# Patient Record
Sex: MALE | Race: WHITE | NOT HISPANIC OR LATINO
[De-identification: names, ages, dates, MRNs, and addresses within clinical notes are randomized per-mention and may not be internally consistent; named-entity substitution may affect disease eponyms.]

---

## 2017-07-12 ENCOUNTER — APPOINTMENT (OUTPATIENT)
Dept: DERMATOLOGY | Facility: CLINIC | Age: 72
End: 2017-07-12

## 2022-07-26 ENCOUNTER — INPATIENT (INPATIENT)
Facility: HOSPITAL | Age: 77
LOS: 9 days | Discharge: NURSING, REHAB OR LONG-TERM CARE | DRG: 177 | End: 2022-08-05
Attending: INTERNAL MEDICINE | Admitting: STUDENT IN AN ORGANIZED HEALTH CARE EDUCATION/TRAINING PROGRAM
Payer: MEDICARE

## 2022-07-26 VITALS
DIASTOLIC BLOOD PRESSURE: 79 MMHG | OXYGEN SATURATION: 98 % | RESPIRATION RATE: 18 BRPM | SYSTOLIC BLOOD PRESSURE: 152 MMHG | WEIGHT: 160.06 LBS | HEART RATE: 82 BPM | TEMPERATURE: 99 F

## 2022-07-26 LAB
ALBUMIN SERPL ELPH-MCNC: 4.4 G/DL — SIGNIFICANT CHANGE UP (ref 3.3–5.2)
ALP SERPL-CCNC: 55 U/L — SIGNIFICANT CHANGE UP (ref 40–120)
ALT FLD-CCNC: 16 U/L — SIGNIFICANT CHANGE UP
ANION GAP SERPL CALC-SCNC: 12 MMOL/L — SIGNIFICANT CHANGE UP (ref 5–17)
APPEARANCE UR: CLEAR — SIGNIFICANT CHANGE UP
AST SERPL-CCNC: 19 U/L — SIGNIFICANT CHANGE UP
BACTERIA # UR AUTO: ABNORMAL
BASOPHILS # BLD AUTO: 0 K/UL — SIGNIFICANT CHANGE UP (ref 0–0.2)
BASOPHILS NFR BLD AUTO: 0 % — SIGNIFICANT CHANGE UP (ref 0–2)
BILIRUB SERPL-MCNC: 0.5 MG/DL — SIGNIFICANT CHANGE UP (ref 0.4–2)
BILIRUB UR-MCNC: NEGATIVE — SIGNIFICANT CHANGE UP
BUN SERPL-MCNC: 30.7 MG/DL — HIGH (ref 8–20)
CALCIUM SERPL-MCNC: 9.6 MG/DL — SIGNIFICANT CHANGE UP (ref 8.4–10.5)
CHLORIDE SERPL-SCNC: 101 MMOL/L — SIGNIFICANT CHANGE UP (ref 98–107)
CO2 SERPL-SCNC: 24 MMOL/L — SIGNIFICANT CHANGE UP (ref 22–29)
COLOR SPEC: YELLOW — SIGNIFICANT CHANGE UP
CREAT SERPL-MCNC: 1.24 MG/DL — SIGNIFICANT CHANGE UP (ref 0.5–1.3)
DIFF PNL FLD: ABNORMAL
EGFR: 60 ML/MIN/1.73M2 — SIGNIFICANT CHANGE UP
EOSINOPHIL # BLD AUTO: 0.04 K/UL — SIGNIFICANT CHANGE UP (ref 0–0.5)
EOSINOPHIL NFR BLD AUTO: 1 % — SIGNIFICANT CHANGE UP (ref 0–6)
EPI CELLS # UR: SIGNIFICANT CHANGE UP
GLUCOSE SERPL-MCNC: 140 MG/DL — HIGH (ref 70–99)
GLUCOSE UR QL: NEGATIVE MG/DL — SIGNIFICANT CHANGE UP
HCT VFR BLD CALC: 34.1 % — LOW (ref 39–50)
HGB BLD-MCNC: 11.3 G/DL — LOW (ref 13–17)
KETONES UR-MCNC: ABNORMAL
LEUKOCYTE ESTERASE UR-ACNC: ABNORMAL
LYMPHOCYTES # BLD AUTO: 0.13 K/UL — LOW (ref 1–3.3)
LYMPHOCYTES # BLD AUTO: 3 % — LOW (ref 13–44)
MANUAL SMEAR VERIFICATION: YES — SIGNIFICANT CHANGE UP
MCHC RBC-ENTMCNC: 30.9 PG — SIGNIFICANT CHANGE UP (ref 27–34)
MCHC RBC-ENTMCNC: 33.1 GM/DL — SIGNIFICANT CHANGE UP (ref 32–36)
MCV RBC AUTO: 93.2 FL — SIGNIFICANT CHANGE UP (ref 80–100)
MONOCYTES # BLD AUTO: 0.18 K/UL — SIGNIFICANT CHANGE UP (ref 0–0.9)
MONOCYTES NFR BLD AUTO: 4 % — SIGNIFICANT CHANGE UP (ref 2–14)
NEUTROPHILS # BLD AUTO: 4.09 K/UL — SIGNIFICANT CHANGE UP (ref 1.8–7.4)
NEUTROPHILS NFR BLD AUTO: 92 % — HIGH (ref 43–77)
NITRITE UR-MCNC: NEGATIVE — SIGNIFICANT CHANGE UP
NRBC # BLD: 0 /100 — SIGNIFICANT CHANGE UP (ref 0–0)
PH UR: 6.5 — SIGNIFICANT CHANGE UP (ref 5–8)
PLAT MORPH BLD: NORMAL — SIGNIFICANT CHANGE UP
PLATELET # BLD AUTO: 142 K/UL — LOW (ref 150–400)
POTASSIUM SERPL-MCNC: 4.1 MMOL/L — SIGNIFICANT CHANGE UP (ref 3.5–5.3)
POTASSIUM SERPL-SCNC: 4.1 MMOL/L — SIGNIFICANT CHANGE UP (ref 3.5–5.3)
PROT SERPL-MCNC: 6.8 G/DL — SIGNIFICANT CHANGE UP (ref 6.6–8.7)
PROT UR-MCNC: 30 MG/DL
RBC # BLD: 3.66 M/UL — LOW (ref 4.2–5.8)
RBC # FLD: 13.3 % — SIGNIFICANT CHANGE UP (ref 10.3–14.5)
RBC BLD AUTO: NORMAL — SIGNIFICANT CHANGE UP
RBC CASTS # UR COMP ASSIST: ABNORMAL /HPF (ref 0–4)
SODIUM SERPL-SCNC: 137 MMOL/L — SIGNIFICANT CHANGE UP (ref 135–145)
SP GR SPEC: 1.01 — SIGNIFICANT CHANGE UP (ref 1.01–1.02)
UROBILINOGEN FLD QL: 1 MG/DL
WBC # BLD: 4.45 K/UL — SIGNIFICANT CHANGE UP (ref 3.8–10.5)
WBC # FLD AUTO: 4.45 K/UL — SIGNIFICANT CHANGE UP (ref 3.8–10.5)
WBC UR QL: SIGNIFICANT CHANGE UP /HPF (ref 0–5)

## 2022-07-26 NOTE — ED ADULT NURSE NOTE - OBJECTIVE STATEMENT
Pt in no apparent distress at this time. Airway patent, breathing spontaneous and nonlabored. Pt A&Ox3 resting in stretcher. Pt c/o       , trip and fall at home, denies LOC or hitting head. pin ot right shoulder area which is chronic for pt form previous fall. last fall a few months ago.

## 2022-07-26 NOTE — ED ADULT NURSE NOTE - NSIMPLEMENTINTERV_GEN_ALL_ED
Implemented All Fall Risk Interventions:  [place] to call system. Call bell, personal items and telephone within reach. Instruct patient to call for assistance. Room bathroom lighting operational. Non-slip footwear when patient is off stretcher. Physically safe environment: no spills, clutter or unnecessary equipment. Stretcher in lowest position, wheels locked, appropriate side rails in place. Provide visual cue, wrist band, yellow gown, etc. Monitor gait and stability. Monitor for mental status changes and reorient to person, place, and time. Review medications for side effects contributing to fall risk. Reinforce activity limits and safety measures with patient and family.

## 2022-07-26 NOTE — ED PROVIDER NOTE - CLINICAL SUMMARY MEDICAL DECISION MAKING FREE TEXT BOX
pt with worsening confusion and difficulty ambulating over the past few weeks/month discussed with wife. Plan for like plan for labs, UA, CT head/neck, likely CDU for pt/sw wilbur

## 2022-07-26 NOTE — ED PROVIDER NOTE - OBJECTIVE STATEMENT
78 y/o male presents to the ED s/p fall at home. Pt is unable to provide clear history of event but states he remembers falling but cannot described why or how he fell. Pt denies any pain. will contact pt wife for further information.

## 2022-07-26 NOTE — ED ADULT TRIAGE NOTE - CHIEF COMPLAINT QUOTE
patient BIBA from home s/p mechanical fall at home, denies hitting head or LOC, not on blood thinners.

## 2022-07-26 NOTE — ED PROVIDER NOTE - PHYSICAL EXAMINATION
Gen: Well appearing in NAD  Head: NC/AT  Neck: trachea midline, no midline spinal tenderness  Card: regular rate and rhythm  Resp:  CTAB  Abd: soft, non-distended, non-tender  Ext: no deformities  Neuro:  A&Ox2, alert to person and place, not time, appears non focal  Skin:  Warm and dry as visualized  Psych:  Normal affect and mood

## 2022-07-26 NOTE — ED PROVIDER NOTE - PROGRESS NOTE DETAILS
[facility] for ED attending, Dr. Dozier: spoke to pt wife Kristin who states pt has hx of HTN, DM glaucoma, and worsening confusion for 2 month and walking slowly "a while" , she states today pt fell in the bathroom against the door, no LOC and states herself and her son had difficulty getting into the bathroom to get him out. She does not feel comfortable with him coming home and would like sw/pt eval.

## 2022-07-27 DIAGNOSIS — R41.0 DISORIENTATION, UNSPECIFIED: ICD-10-CM

## 2022-07-27 LAB
GLUCOSE BLDC GLUCOMTR-MCNC: 106 MG/DL — HIGH (ref 70–99)
GLUCOSE BLDC GLUCOMTR-MCNC: 82 MG/DL — SIGNIFICANT CHANGE UP (ref 70–99)
GLUCOSE BLDC GLUCOMTR-MCNC: 95 MG/DL — SIGNIFICANT CHANGE UP (ref 70–99)
GLUCOSE BLDC GLUCOMTR-MCNC: 99 MG/DL — SIGNIFICANT CHANGE UP (ref 70–99)
RAPID RVP RESULT: DETECTED
SARS-COV-2 RNA SPEC QL NAA+PROBE: DETECTED

## 2022-07-27 PROCEDURE — G1004: CPT

## 2022-07-27 PROCEDURE — 72125 CT NECK SPINE W/O DYE: CPT | Mod: 26,MG

## 2022-07-27 PROCEDURE — 99223 1ST HOSP IP/OBS HIGH 75: CPT

## 2022-07-27 PROCEDURE — 70450 CT HEAD/BRAIN W/O DYE: CPT | Mod: 26,MG

## 2022-07-27 PROCEDURE — 73522 X-RAY EXAM HIPS BI 3-4 VIEWS: CPT | Mod: 26

## 2022-07-27 PROCEDURE — 99220: CPT | Mod: FS,CS

## 2022-07-27 RX ORDER — LOSARTAN POTASSIUM 100 MG/1
25 TABLET, FILM COATED ORAL DAILY
Refills: 0 | Status: DISCONTINUED | OUTPATIENT
Start: 2022-07-27 | End: 2022-07-28

## 2022-07-27 RX ORDER — DEXTROSE 50 % IN WATER 50 %
25 SYRINGE (ML) INTRAVENOUS ONCE
Refills: 0 | Status: DISCONTINUED | OUTPATIENT
Start: 2022-07-27 | End: 2022-08-05

## 2022-07-27 RX ORDER — DEXTROSE 50 % IN WATER 50 %
15 SYRINGE (ML) INTRAVENOUS ONCE
Refills: 0 | Status: DISCONTINUED | OUTPATIENT
Start: 2022-07-27 | End: 2022-08-05

## 2022-07-27 RX ORDER — LEVOTHYROXINE SODIUM 125 MCG
50 TABLET ORAL DAILY
Refills: 0 | Status: DISCONTINUED | OUTPATIENT
Start: 2022-07-27 | End: 2022-08-05

## 2022-07-27 RX ORDER — LATANOPROST 0.05 MG/ML
1 SOLUTION/ DROPS OPHTHALMIC; TOPICAL AT BEDTIME
Refills: 0 | Status: DISCONTINUED | OUTPATIENT
Start: 2022-07-27 | End: 2022-08-05

## 2022-07-27 RX ORDER — SODIUM CHLORIDE 9 MG/ML
1000 INJECTION, SOLUTION INTRAVENOUS
Refills: 0 | Status: DISCONTINUED | OUTPATIENT
Start: 2022-07-27 | End: 2022-08-05

## 2022-07-27 RX ORDER — DEXTROSE 50 % IN WATER 50 %
12.5 SYRINGE (ML) INTRAVENOUS ONCE
Refills: 0 | Status: DISCONTINUED | OUTPATIENT
Start: 2022-07-27 | End: 2022-08-05

## 2022-07-27 RX ORDER — INSULIN LISPRO 100/ML
VIAL (ML) SUBCUTANEOUS
Refills: 0 | Status: DISCONTINUED | OUTPATIENT
Start: 2022-07-27 | End: 2022-08-05

## 2022-07-27 RX ORDER — ACETAMINOPHEN 500 MG
650 TABLET ORAL EVERY 6 HOURS
Refills: 0 | Status: COMPLETED | OUTPATIENT
Start: 2022-07-27 | End: 2022-07-30

## 2022-07-27 RX ORDER — GLUCAGON INJECTION, SOLUTION 0.5 MG/.1ML
1 INJECTION, SOLUTION SUBCUTANEOUS ONCE
Refills: 0 | Status: DISCONTINUED | OUTPATIENT
Start: 2022-07-27 | End: 2022-08-05

## 2022-07-27 RX ORDER — SODIUM CHLORIDE 9 MG/ML
1000 INJECTION INTRAMUSCULAR; INTRAVENOUS; SUBCUTANEOUS
Refills: 0 | Status: DISCONTINUED | OUTPATIENT
Start: 2022-07-27 | End: 2022-07-28

## 2022-07-27 RX ADMIN — LATANOPROST 1 DROP(S): 0.05 SOLUTION/ DROPS OPHTHALMIC; TOPICAL at 22:53

## 2022-07-27 RX ADMIN — Medication 650 MILLIGRAM(S): at 18:12

## 2022-07-27 RX ADMIN — Medication 650 MILLIGRAM(S): at 17:42

## 2022-07-27 RX ADMIN — SODIUM CHLORIDE 100 MILLILITER(S): 9 INJECTION INTRAMUSCULAR; INTRAVENOUS; SUBCUTANEOUS at 22:54

## 2022-07-27 NOTE — H&P ADULT - NSHPPHYSICALEXAM_GEN_ALL_CORE
General: thin elderly male, sitting in in the bed, NAD   Head and neck: normocephalic, no JVD   ENT: normal   Cardio: regular rate and rhythm, no murmur   Pulm: clear breath sounds, no wheezing   GI: abdomen is soft, BS (+)   Extr: no edema   Neuro: AOx2 to self and place, no focal weakness   Skin: clear and dry

## 2022-07-27 NOTE — PHYSICAL THERAPY INITIAL EVALUATION ADULT - ADDITIONAL COMMENTS
Pt is a poor historian, reports living with wife in an apartment, no sure if there are steps.  Unsure if he uses a device to amb.

## 2022-07-27 NOTE — ED CDU PROVIDER INITIAL DAY NOTE - ATTENDING APP SHARED VISIT CONTRIBUTION OF CARE
Patient presented to ED s/p fall and with no complaints. Pleasantly confused. Wife over the phone reports intermittent worsening confusion x months. Unable to care for him at home. Placed in CDU for SW/PT eval.

## 2022-07-27 NOTE — PROVIDER CONTACT NOTE (OTHER) - ASSESSMENT
Pt with 0/10 pain before, during or after RX.  Pt will benefit from PT to maximize functional independence.  Will continue to follow.  Pt left supine in bed in no apparent distress and call bell within reach. Nurse aware

## 2022-07-27 NOTE — H&P ADULT - ASSESSMENT
76 yo male from home, lives with wife, with PMHs of HTN, DM and Glaucoma brought by EMS s/p fall. Pt is not a good historian and not able to provide a detail history. He reports having nasal congestion 2-3 days ago which resolved now.   Per wife over the phone, pt fell in the bathroom yesterday and was not able to get up. He was on the floor for 1 hrs.  He fell on his buttocks. She does not reports him hitting his head or LOC.   She also reports that lately pt forgets things and she suspects that he has dementia.   During my encounter, pt denied having any pain, headache, dizziness, fever, cough, SOB, palpitation, nausea, vomiting, abdominal pain, change in urinary or bowel habits   PT initially admitted to observation unit, he is COVID (+) and not thompson to go to rehab for 5 days       COVID 19 infection   admit to medicine   No respiratory covid symptoms  c/w IV fluids and Tylenol for pain     Fall likely secondary to COVID   Seen by PT, plan for [facility]     HTN: Losartan resumed     TYpe 2 DM   Follow HBA1C, monitor BG level   c/w Insulin per SS for now      Glaucoma; eye drops resumed     DVT prophylaxis     Spoke to pts wife over the phone

## 2022-07-27 NOTE — H&P ADULT - HISTORY OF PRESENT ILLNESS
76 yo male from home, lives with wife, with PMHs of HTN, DM and Glaucoma brought by EMS s/p fall. Pt is not a good historian and not able to provide a detail history. He reports having nasal congestion 2-3 days ago which resolved now.   Per wife over the phone, pt fell in the bathroom yesterday and was not able to get up. He was on the floor for 1 hrs.  He fell on his buttocks. She does not reports him hitting his head or LOC.   She also reports that lately pt forgets things and she suspects that he has dementia.   During my encounter, pt denied having any pain, headache, dizziness, fever, cough, SOB, palpitation, nausea, vomiting, abdominal pain, change in urinary or bowel habits

## 2022-07-27 NOTE — ED CDU PROVIDER DISPOSITION NOTE - ATTENDING APP SHARED VISIT CONTRIBUTION OF CARE
78 y/o male presents to the ED s/p fall at home. Pt is unable to provide clear history of event but states he remembers falling but cannot described why or how he fell. Pt denies any pain. will contact pt wife for further information. Patient placed into observation for PT assessment, was found to be covid positive, plan for [facility], spoke to CM advises admission, for quarantine.  Patient currently not hypoxic or

## 2022-07-27 NOTE — PATIENT PROFILE ADULT - FALL HARM RISK - HARM RISK INTERVENTIONS
Assistance with ambulation/Assistance OOB with selected safe patient handling equipment/Communicate Risk of Fall with Harm to all staff/Monitor for mental status changes/Move patient closer to nurses' station/Reinforce activity limits and safety measures with patient and family/Reorient to person, place and time as needed/Tailored Fall Risk Interventions/Toileting schedule using arm’s reach rule for commode and bathroom/Use of alarms - bed, chair and/or voice tab/Visual Cue: Yellow wristband and red socks/Bed in lowest position, wheels locked, appropriate side rails in place/Call bell, personal items and telephone in reach/Instruct patient to call for assistance before getting out of bed or chair/Non-slip footwear when patient is out of bed/[place] to call system/Physically safe environment - no spills, clutter or unnecessary equipment/Purposeful Proactive Rounding/Room/bathroom lighting operational, light cord in reach

## 2022-07-27 NOTE — ED CDU PROVIDER DISPOSITION NOTE - CLINICAL COURSE
This is a 78 y/o male presents to the ED s/p fall at home. Pt is unable to provide clear history of event but states he remembers falling but cannot described why or how he fell. Pt denies any pain. will contact pt wife for further information. Patient placed into observation for PT assessment, was found to be covid positive, plan for [facility], spoke to CM advises admission, for quarantine.  Patient currently not hypoxic or requiring supplemental oxygen.

## 2022-07-27 NOTE — H&P ADULT - NSHPLABSRESULTS_GEN_ALL_CORE
CBC Full  -  ( 26 Jul 2022 20:00 )  WBC Count : 4.45 K/uL  RBC Count : 3.66 M/uL  Hemoglobin : 11.3 g/dL  Hematocrit : 34.1 %  Platelet Count - Automated : 142 K/uL  Mean Cell Volume : 93.2 fl  Mean Cell Hemoglobin : 30.9 pg  Mean Cell Hemoglobin Concentration : 33.1 gm/dL  Auto Neutrophil # : 4.09 K/uL  Auto Lymphocyte # : 0.13 K/uL  Auto Monocyte # : 0.18 K/uL  Auto Eosinophil # : 0.04 K/uL  Auto Basophil # : 0.00 K/uL  Auto Neutrophil % : 92.0 %  Auto Lymphocyte % : 3.0 %  Auto Monocyte % : 4.0 %  Auto Eosinophil % : 1.0 %  Auto Basophil % : 0.0 %      07-26    137  |  101  |  30.7<H>  ----------------------------<  140<H>  4.1   |  24.0  |  1.24    Ca    9.6      26 Jul 2022 20:00    TPro  6.8  /  Alb  4.4  /  TBili  0.5  /  DBili  x   /  AST  19  /  ALT  16  /  AlkPhos  55  07-26

## 2022-07-28 LAB
A1C WITH ESTIMATED AVERAGE GLUCOSE RESULT: 5.7 % — HIGH (ref 4–5.6)
ANION GAP SERPL CALC-SCNC: 16 MMOL/L — SIGNIFICANT CHANGE UP (ref 5–17)
BASOPHILS # BLD AUTO: 0.01 K/UL — SIGNIFICANT CHANGE UP (ref 0–0.2)
BASOPHILS NFR BLD AUTO: 0.1 % — SIGNIFICANT CHANGE UP (ref 0–2)
BUN SERPL-MCNC: 42.9 MG/DL — HIGH (ref 8–20)
CALCIUM SERPL-MCNC: 8.9 MG/DL — SIGNIFICANT CHANGE UP (ref 8.4–10.5)
CHLORIDE SERPL-SCNC: 100 MMOL/L — SIGNIFICANT CHANGE UP (ref 98–107)
CK MB CFR SERPL CALC: 8.7 NG/ML — HIGH (ref 0–6.7)
CK SERPL-CCNC: 664 U/L — HIGH (ref 30–200)
CO2 SERPL-SCNC: 20 MMOL/L — LOW (ref 22–29)
CREAT SERPL-MCNC: 1.75 MG/DL — HIGH (ref 0.5–1.3)
CULTURE RESULTS: NO GROWTH — SIGNIFICANT CHANGE UP
CULTURE RESULTS: SIGNIFICANT CHANGE UP
EGFR: 39 ML/MIN/1.73M2 — LOW
EOSINOPHIL # BLD AUTO: 0 K/UL — SIGNIFICANT CHANGE UP (ref 0–0.5)
EOSINOPHIL NFR BLD AUTO: 0 % — SIGNIFICANT CHANGE UP (ref 0–6)
ESTIMATED AVERAGE GLUCOSE: 117 MG/DL — HIGH (ref 68–114)
GLUCOSE BLDC GLUCOMTR-MCNC: 112 MG/DL — HIGH (ref 70–99)
GLUCOSE BLDC GLUCOMTR-MCNC: 137 MG/DL — HIGH (ref 70–99)
GLUCOSE BLDC GLUCOMTR-MCNC: 81 MG/DL — SIGNIFICANT CHANGE UP (ref 70–99)
GLUCOSE BLDC GLUCOMTR-MCNC: 85 MG/DL — SIGNIFICANT CHANGE UP (ref 70–99)
GLUCOSE SERPL-MCNC: 136 MG/DL — HIGH (ref 70–99)
HCT VFR BLD CALC: 36.9 % — LOW (ref 39–50)
HGB BLD-MCNC: 12.4 G/DL — LOW (ref 13–17)
IMM GRANULOCYTES NFR BLD AUTO: 0.3 % — SIGNIFICANT CHANGE UP (ref 0–1.5)
LYMPHOCYTES # BLD AUTO: 0.19 K/UL — LOW (ref 1–3.3)
LYMPHOCYTES # BLD AUTO: 1.8 % — LOW (ref 13–44)
MAGNESIUM SERPL-MCNC: 1.8 MG/DL — SIGNIFICANT CHANGE UP (ref 1.6–2.6)
MCHC RBC-ENTMCNC: 30.7 PG — SIGNIFICANT CHANGE UP (ref 27–34)
MCHC RBC-ENTMCNC: 33.6 GM/DL — SIGNIFICANT CHANGE UP (ref 32–36)
MCV RBC AUTO: 91.3 FL — SIGNIFICANT CHANGE UP (ref 80–100)
MONOCYTES # BLD AUTO: 0.31 K/UL — SIGNIFICANT CHANGE UP (ref 0–0.9)
MONOCYTES NFR BLD AUTO: 3 % — SIGNIFICANT CHANGE UP (ref 2–14)
NEUTROPHILS # BLD AUTO: 9.93 K/UL — HIGH (ref 1.8–7.4)
NEUTROPHILS NFR BLD AUTO: 94.8 % — HIGH (ref 43–77)
PLATELET # BLD AUTO: 125 K/UL — LOW (ref 150–400)
POTASSIUM SERPL-MCNC: 3.5 MMOL/L — SIGNIFICANT CHANGE UP (ref 3.5–5.3)
POTASSIUM SERPL-SCNC: 3.5 MMOL/L — SIGNIFICANT CHANGE UP (ref 3.5–5.3)
RBC # BLD: 4.04 M/UL — LOW (ref 4.2–5.8)
RBC # FLD: 13.5 % — SIGNIFICANT CHANGE UP (ref 10.3–14.5)
SODIUM SERPL-SCNC: 136 MMOL/L — SIGNIFICANT CHANGE UP (ref 135–145)
SPECIMEN SOURCE: SIGNIFICANT CHANGE UP
SPECIMEN SOURCE: SIGNIFICANT CHANGE UP
WBC # BLD: 10.47 K/UL — SIGNIFICANT CHANGE UP (ref 3.8–10.5)
WBC # FLD AUTO: 10.47 K/UL — SIGNIFICANT CHANGE UP (ref 3.8–10.5)

## 2022-07-28 PROCEDURE — 76770 US EXAM ABDO BACK WALL COMP: CPT | Mod: 26

## 2022-07-28 PROCEDURE — 99233 SBSQ HOSP IP/OBS HIGH 50: CPT

## 2022-07-28 RX ORDER — SODIUM CHLORIDE 9 MG/ML
1000 INJECTION INTRAMUSCULAR; INTRAVENOUS; SUBCUTANEOUS
Refills: 0 | Status: COMPLETED | OUTPATIENT
Start: 2022-07-28 | End: 2022-07-28

## 2022-07-28 RX ORDER — TAMSULOSIN HYDROCHLORIDE 0.4 MG/1
0.8 CAPSULE ORAL AT BEDTIME
Refills: 0 | Status: DISCONTINUED | OUTPATIENT
Start: 2022-07-28 | End: 2022-08-05

## 2022-07-28 RX ADMIN — Medication 650 MILLIGRAM(S): at 13:10

## 2022-07-28 RX ADMIN — Medication 650 MILLIGRAM(S): at 05:09

## 2022-07-28 RX ADMIN — Medication 650 MILLIGRAM(S): at 23:03

## 2022-07-28 RX ADMIN — Medication 650 MILLIGRAM(S): at 18:34

## 2022-07-28 RX ADMIN — Medication 50 MICROGRAM(S): at 05:08

## 2022-07-28 RX ADMIN — Medication 650 MILLIGRAM(S): at 12:41

## 2022-07-28 RX ADMIN — TAMSULOSIN HYDROCHLORIDE 0.8 MILLIGRAM(S): 0.4 CAPSULE ORAL at 22:59

## 2022-07-28 RX ADMIN — SODIUM CHLORIDE 75 MILLILITER(S): 9 INJECTION INTRAMUSCULAR; INTRAVENOUS; SUBCUTANEOUS at 12:55

## 2022-07-28 RX ADMIN — LATANOPROST 1 DROP(S): 0.05 SOLUTION/ DROPS OPHTHALMIC; TOPICAL at 22:59

## 2022-07-28 RX ADMIN — Medication 650 MILLIGRAM(S): at 06:00

## 2022-07-28 RX ADMIN — LOSARTAN POTASSIUM 25 MILLIGRAM(S): 100 TABLET, FILM COATED ORAL at 05:09

## 2022-07-28 NOTE — PROGRESS NOTE ADULT - SUBJECTIVE AND OBJECTIVE BOX
CHIEF COMPLAINT/INTERVAL HISTORY:    Patient is a 77y old  Male who presents with a chief complaint of Fall (2022 15:36)    SUBJECTIVE & OBJECTIVE: Pt seen and examined at bedside. No overnight events reported. Patient with rising creatinine; RN reports patient is voiding with blood tinged urine. Serial bladder scans ordered.    ROS: Denies pain; limited due to cognitive impairment    ICU Vital Signs Last 24 Hrs  T(C): 36.8 (2022 17:07), Max: 37.5 (2022 04:57)  T(F): 98.2 (2022 17:07), Max: 99.5 (2022 04:57)  HR: 67 (2022 17:07) (66 - 86)  BP: 136/77 (2022 17:07) (118/75 - 142/91)  RR: 18 (2022 17:07) (16 - 18)  SpO2: 93% (2022 17:07) (93% - 99%)    O2 Parameters below as of 2022 17:07  Patient On (Oxygen Delivery Method): room air    MEDICATIONS  (STANDING):  acetaminophen     Tablet .. 650 milliGRAM(s) Oral every 6 hours  dextrose 5%. 1000 milliLiter(s) (50 mL/Hr) IV Continuous <Continuous>  dextrose 5%. 1000 milliLiter(s) (100 mL/Hr) IV Continuous <Continuous>  dextrose 50% Injectable 25 Gram(s) IV Push once  dextrose 50% Injectable 12.5 Gram(s) IV Push once  dextrose 50% Injectable 25 Gram(s) IV Push once  glucagon  Injectable 1 milliGRAM(s) IntraMuscular once  insulin lispro (ADMELOG) corrective regimen sliding scale   SubCutaneous three times a day before meals  latanoprost 0.005% Ophthalmic Solution 1 Drop(s) Both EYES at bedtime  levothyroxine 50 MICROGram(s) Oral daily    MEDICATIONS  (PRN):  dextrose Oral Gel 15 Gram(s) Oral once PRN Blood Glucose LESS THAN 70 milliGRAM(s)/deciliter      LABS:                        12.4   10.47 )-----------( 125      ( 2022 05:47 )             36.9     07-    136  |  100  |  42.9<H>  ----------------------------<  136<H>  3.5   |  20.0<L>  |  1.75<H>    Ca    8.9      2022 05:47  Mg     1.8         TPro  6.8  /  Alb  4.4  /  TBili  0.5  /  DBili  x   /  AST  19  /  ALT  16  /  AlkPhos  55        Urinalysis Basic - ( 2022 22:51 )    Color: Yellow / Appearance: Clear / S.010 / pH: x  Gluc: x / Ketone: Trace  / Bili: Negative / Urobili: 1 mg/dL   Blood: x / Protein: 30 mg/dL / Nitrite: Negative   Leuk Esterase: Trace / RBC: 11-25 /HPF / WBC 0-2 /HPF   Sq Epi: x / Non Sq Epi: Occasional / Bacteria: Occasional        CAPILLARY BLOOD GLUCOSE      POCT Blood Glucose.: 112 mg/dL (2022 12:40)  POCT Blood Glucose.: 137 mg/dL (2022 08:58)  POCT Blood Glucose.: 106 mg/dL (2022 22:52)    PHYSICAL EXAM:    GENERAL: elderly male, laying in bed, confused, NAD  HEAD:  Atraumatic, Normocephalic  EYES: EOMI, PERRLA, conjunctiva and sclera clear  ENMT: Moist mucous membranes  NECK: Supple  NERVOUS SYSTEM:  Alert & Oriented X0  CHEST/LUNG: coarse breath sounds  HEART: Regular rate and rhythm; + S1/S2  ABDOMEN: Soft, Nontender, Nondistended; Bowel sounds present  EXTREMITIES:  no pedal edema

## 2022-07-28 NOTE — PROGRESS NOTE ADULT - ASSESSMENT
Patient is a 77 year old male with PMH of HTN, DM, Glaucoma and Forgetfulness who was brought to the ED after sustaining a fall in his home. Patient was seen by PT and [facility] was recommended but patient incidentally found to be COVID positive and needed inpatient admission for 5 days prior to be discharged to [facility].     Fall likely due to deconditioning in the setting of a viral infection  check CPK and continue IV hydration  maintain fall precautions  PT recs appreciated; will need [facility]    COVID 19 infection   asymptomatic   No respiratory symptoms or hypoxia  No CXR done  airborne isolation x 5 days     ?Hematuria  -RN reports blood tinged urine  -UA with PRBCs  -hold DVT ppx   -Check coags   -Renal/bladder US  -check CPK as above  -if patient has gross hematuria with clots; will consult     HTN  -BP stable   -Hold Losartan due to SYEDA    -Add additional agent if BP is elevated    TYpe 2 DM   HBA1C 5.7  c/w Insulin per SS   ADA diet    Glaucoma  -continue latanoprost gtt    DVT prophylaxis - hold due to blood tinged urine    Dispo - IV hydration. Renal/bladder US. Once medically stable, will need [facility] but must complete inpatient hospitalization for 5 days prior to be discharge. Patient is a 77 year old male with PMH of HTN, DM, Glaucoma and Forgetfulness who was brought to the ED after sustaining a fall in his home. Patient was seen by PT and [facility] was recommended but patient incidentally found to be COVID positive and needed inpatient admission for 5 days prior to be discharged to [facility].     Fall likely due to deconditioning in the setting of a viral infection  check CPK and continue IV hydration  maintain fall precautions  CT head and neck negative for acute pathology  PT recs appreciated; will need [facility]    COVID 19 infection   No respiratory symptoms or hypoxia  does not require treatment  No CXR done  airborne isolation x 5 days     Acute Metabolic Encephalopathy due to COVID encephalopathy superimposed on dementia  -patient with forgetfulness and suspected dementia per H&P; unable to reach wife for baseline mentation  -currently AAO x 0   -CT head negative as above  -UA negative for UTI  -Check TSH, B12, Ammonia levels  -will clarify baseline mentation    ?Hematuria  -RN reports blood tinged urine  -UA with PRBCs  -hold DVT ppx   -Check coags   -Renal/bladder US  -check CPK as above  -if patient has gross hematuria with clots; will consult     HTN  -BP stable   -Hold Losartan due to SYEDA    -Add additional agent if BP is elevated    TYpe 2 DM   HBA1C 5.7  c/w Insulin per SS   ADA diet    Glaucoma  -continue latanoprost gtt    Hypothyroidism  -check TSH  -continue synthroid    DVT prophylaxis - hold due to blood tinged urine    Dispo - IV hydration. Renal/bladder US. Once medically stable, will need [facility] but must complete inpatient hospitalization for 5 days prior to be discharge.

## 2022-07-29 LAB
AMMONIA BLD-MCNC: 30 UMOL/L — SIGNIFICANT CHANGE UP (ref 11–55)
ANION GAP SERPL CALC-SCNC: 14 MMOL/L — SIGNIFICANT CHANGE UP (ref 5–17)
BASOPHILS # BLD AUTO: 0.01 K/UL — SIGNIFICANT CHANGE UP (ref 0–0.2)
BASOPHILS NFR BLD AUTO: 0.1 % — SIGNIFICANT CHANGE UP (ref 0–2)
BUN SERPL-MCNC: 44.2 MG/DL — HIGH (ref 8–20)
CALCIUM SERPL-MCNC: 8.8 MG/DL — SIGNIFICANT CHANGE UP (ref 8.4–10.5)
CHLORIDE SERPL-SCNC: 105 MMOL/L — SIGNIFICANT CHANGE UP (ref 98–107)
CK MB CFR SERPL CALC: 15.2 NG/ML — HIGH (ref 0–6.7)
CK SERPL-CCNC: 734 U/L — HIGH (ref 30–200)
CO2 SERPL-SCNC: 19 MMOL/L — LOW (ref 22–29)
CREAT SERPL-MCNC: 1.22 MG/DL — SIGNIFICANT CHANGE UP (ref 0.5–1.3)
EGFR: 61 ML/MIN/1.73M2 — SIGNIFICANT CHANGE UP
EOSINOPHIL # BLD AUTO: 0.01 K/UL — SIGNIFICANT CHANGE UP (ref 0–0.5)
EOSINOPHIL NFR BLD AUTO: 0.1 % — SIGNIFICANT CHANGE UP (ref 0–6)
GLUCOSE BLDC GLUCOMTR-MCNC: 82 MG/DL — SIGNIFICANT CHANGE UP (ref 70–99)
GLUCOSE BLDC GLUCOMTR-MCNC: 84 MG/DL — SIGNIFICANT CHANGE UP (ref 70–99)
GLUCOSE BLDC GLUCOMTR-MCNC: 86 MG/DL — SIGNIFICANT CHANGE UP (ref 70–99)
GLUCOSE BLDC GLUCOMTR-MCNC: 89 MG/DL — SIGNIFICANT CHANGE UP (ref 70–99)
GLUCOSE SERPL-MCNC: 83 MG/DL — SIGNIFICANT CHANGE UP (ref 70–99)
HCT VFR BLD CALC: 35.7 % — LOW (ref 39–50)
HGB BLD-MCNC: 12.4 G/DL — LOW (ref 13–17)
IMM GRANULOCYTES NFR BLD AUTO: 0.2 % — SIGNIFICANT CHANGE UP (ref 0–1.5)
LYMPHOCYTES # BLD AUTO: 0.47 K/UL — LOW (ref 1–3.3)
LYMPHOCYTES # BLD AUTO: 5.7 % — LOW (ref 13–44)
MAGNESIUM SERPL-MCNC: 1.7 MG/DL — SIGNIFICANT CHANGE UP (ref 1.6–2.6)
MCHC RBC-ENTMCNC: 31.5 PG — SIGNIFICANT CHANGE UP (ref 27–34)
MCHC RBC-ENTMCNC: 34.7 GM/DL — SIGNIFICANT CHANGE UP (ref 32–36)
MCV RBC AUTO: 90.6 FL — SIGNIFICANT CHANGE UP (ref 80–100)
MONOCYTES # BLD AUTO: 0.37 K/UL — SIGNIFICANT CHANGE UP (ref 0–0.9)
MONOCYTES NFR BLD AUTO: 4.5 % — SIGNIFICANT CHANGE UP (ref 2–14)
NEUTROPHILS # BLD AUTO: 7.4 K/UL — SIGNIFICANT CHANGE UP (ref 1.8–7.4)
NEUTROPHILS NFR BLD AUTO: 89.4 % — HIGH (ref 43–77)
PHOSPHATE SERPL-MCNC: 3 MG/DL — SIGNIFICANT CHANGE UP (ref 2.4–4.7)
PLATELET # BLD AUTO: 136 K/UL — LOW (ref 150–400)
POTASSIUM SERPL-MCNC: 4.1 MMOL/L — SIGNIFICANT CHANGE UP (ref 3.5–5.3)
POTASSIUM SERPL-SCNC: 4.1 MMOL/L — SIGNIFICANT CHANGE UP (ref 3.5–5.3)
RBC # BLD: 3.94 M/UL — LOW (ref 4.2–5.8)
RBC # FLD: 13.6 % — SIGNIFICANT CHANGE UP (ref 10.3–14.5)
SODIUM SERPL-SCNC: 138 MMOL/L — SIGNIFICANT CHANGE UP (ref 135–145)
TSH SERPL-MCNC: 3.61 UIU/ML — SIGNIFICANT CHANGE UP (ref 0.27–4.2)
VIT B12 SERPL-MCNC: 664 PG/ML — SIGNIFICANT CHANGE UP (ref 232–1245)
WBC # BLD: 8.28 K/UL — SIGNIFICANT CHANGE UP (ref 3.8–10.5)
WBC # FLD AUTO: 8.28 K/UL — SIGNIFICANT CHANGE UP (ref 3.8–10.5)

## 2022-07-29 PROCEDURE — 99285 EMERGENCY DEPT VISIT HI MDM: CPT | Mod: CS

## 2022-07-29 PROCEDURE — 99221 1ST HOSP IP/OBS SF/LOW 40: CPT | Mod: FS

## 2022-07-29 PROCEDURE — 99233 SBSQ HOSP IP/OBS HIGH 50: CPT

## 2022-07-29 PROCEDURE — 74176 CT ABD & PELVIS W/O CONTRAST: CPT | Mod: 26

## 2022-07-29 RX ORDER — SODIUM CHLORIDE 9 MG/ML
1000 INJECTION, SOLUTION INTRAVENOUS
Refills: 0 | Status: DISCONTINUED | OUTPATIENT
Start: 2022-07-29 | End: 2022-08-01

## 2022-07-29 RX ORDER — SODIUM CHLORIDE 9 MG/ML
1000 INJECTION INTRAMUSCULAR; INTRAVENOUS; SUBCUTANEOUS
Refills: 0 | Status: DISCONTINUED | OUTPATIENT
Start: 2022-07-29 | End: 2022-07-29

## 2022-07-29 RX ADMIN — LATANOPROST 1 DROP(S): 0.05 SOLUTION/ DROPS OPHTHALMIC; TOPICAL at 21:02

## 2022-07-29 RX ADMIN — SODIUM CHLORIDE 60 MILLILITER(S): 9 INJECTION, SOLUTION INTRAVENOUS at 18:25

## 2022-07-29 RX ADMIN — Medication 650 MILLIGRAM(S): at 23:39

## 2022-07-29 RX ADMIN — Medication 650 MILLIGRAM(S): at 05:59

## 2022-07-29 RX ADMIN — Medication 650 MILLIGRAM(S): at 17:31

## 2022-07-29 RX ADMIN — TAMSULOSIN HYDROCHLORIDE 0.8 MILLIGRAM(S): 0.4 CAPSULE ORAL at 21:02

## 2022-07-29 RX ADMIN — Medication 650 MILLIGRAM(S): at 12:01

## 2022-07-29 RX ADMIN — Medication 50 MICROGRAM(S): at 05:59

## 2022-07-29 RX ADMIN — SODIUM CHLORIDE 60 MILLILITER(S): 9 INJECTION INTRAMUSCULAR; INTRAVENOUS; SUBCUTANEOUS at 12:01

## 2022-07-29 NOTE — CONSULT NOTE ADULT - ASSESSMENT
76 yo male with mild hematuria, right hydronephrosis, paraphimosis, SYEDA resolved  - paraphimosis reduced  - applied condom cath on pt with foreskin remaining reduced  - bladder scans for PVR's  - no need for [place] at this time  - recommend CT renal stone hunt  - will follow

## 2022-07-29 NOTE — CONSULT NOTE ADULT - SUBJECTIVE AND OBJECTIVE BOX
HPI:  76 yo male from home, lives with wife, with PMHs of HTN, DM and Glaucoma brought by EMS s/p fall. Pt is not a good historian and not able to provide a detail history. He reports having nasal congestion 2-3 days ago which resolved now.   Per wife over the phone, pt fell in the bathroom yesterday and was not able to get up. He was on the floor for 1 hrs.  He fell on his buttocks. She does not reports him hitting his head or LOC.   She also reports that lately pt forgets things and she suspects that he has dementia.   During my encounter, pt denied having any pain, headache, dizziness, fever, cough, SOB, palpitation, nausea, vomiting, abdominal pain, change in urinary or bowel habits  (27 Jul 2022 15:36)    Urology:  called to see pt for hematuria and right hydronephrosis.  pt is a poor historian, not much information obtained.  Pt denies seeing any blood in his urine previously. Pt resting comfortably, no c/o pain, condom catheter not currently on pt.  RN reports he had blood tinged urine, no clots. Pt unable to state how often he voids at night or if he has any hesitancy or frequency.  Pt bladder scanned earlier, 217ml in bladder.    PAST MEDICAL & SURGICAL HISTORY:  HTN (hypertension)      Hypothyroid      Glaucoma      DM (diabetes mellitus)          acetaminophen     Tablet .. 650 milliGRAM(s) Oral every 6 hours  dextrose 5%. 1000 milliLiter(s) IV Continuous <Continuous>  dextrose 5%. 1000 milliLiter(s) IV Continuous <Continuous>  dextrose 50% Injectable 25 Gram(s) IV Push once  dextrose 50% Injectable 12.5 Gram(s) IV Push once  dextrose 50% Injectable 25 Gram(s) IV Push once  dextrose Oral Gel 15 Gram(s) Oral once PRN  glucagon  Injectable 1 milliGRAM(s) IntraMuscular once  insulin lispro (ADMELOG) corrective regimen sliding scale   SubCutaneous three times a day before meals  latanoprost 0.005% Ophthalmic Solution 1 Drop(s) Both EYES at bedtime  levothyroxine 50 MICROGram(s) Oral daily  sodium chloride 0.9%. 1000 milliLiter(s) IV Continuous <Continuous>  tamsulosin 0.8 milliGRAM(s) Oral at bedtime      No Known Allergies      T(C): 35.8 (07-29-22 @ 10:01), Max: 36.8 (07-28-22 @ 17:07)  HR: 89 (07-29-22 @ 10:01) (67 - 89)  BP: 147/78 (07-29-22 @ 10:01) (134/74 - 147/78)  RR: 18 (07-29-22 @ 10:01) (18 - 18)  SpO2: 97% (07-29-22 @ 10:01) (93% - 100%)                              12.4   8.28  )-----------( 136      ( 29 Jul 2022 07:00 )             35.7       07-29    138  |  105  |  44.2<H>  ----------------------------<  83  4.1   |  19.0<L>  |  1.22    Ca    8.8      29 Jul 2022 07:00  Phos  3.0     07-29  Mg     1.7     07-29        Radiology: < from: US Kidney and Bladder (07.28.22 @ 20:19) >    ACC: [id number] EXAM:  US KIDNEYS AND BLADDER                          PROCEDURE DATE:  07/28/2022          INTERPRETATION:  CLINICAL INFORMATION: Acute kidney injury and hematuria    COMPARISON: None available.    TECHNIQUE: Sonography of the kidneys and bladder.    FINDINGS:  Right kidney: 11.5 cm. No renal mass or calculi. There is severe right   hydronephrosis    Left kidney: 10.8 cm. No renal mass, hydronephrosis or calculi. Mid pole   cyst with septations measures 3.1 x 2.6 x 2.8 cm.    Urinary bladder: Thick-walled and trabeculated with debris. Volume is 303   cc. There is an anterior diverticulum the prostate gland is enlarged   measuring 5.1 x 4.5 x 5.2 cm. Prostate volume is 59 cc.    IMPRESSION:  Severe right hydronephrosis  Complex left renal cyst may be better evaluated with dedicated pre and   post contrast imaging utilizing CT or MR in a nonemergent basis.  Enlarged prostate gland    --- End of Report ---      < end of copied text >

## 2022-07-29 NOTE — CONSULT NOTE ADULT - ADDITIONAL PE
rectal: fair sphincter tone, no stool in vault, enlarged, firm prostate, no nodules palpated, nontender

## 2022-07-29 NOTE — PROGRESS NOTE ADULT - SUBJECTIVE AND OBJECTIVE BOX
CC; Follow up     INTERVAL HPI/OVERNIGHT EVENTS: Patient seen and examined, hematura per RN overnight. Bladder scan with PVR of 214 this morning. Poor historian. Afebrile. NO Hypoxia       Vital Signs Last 24 Hrs  T(C): 36.5 (29 Jul 2022 04:54), Max: 36.8 (28 Jul 2022 17:07)  T(F): 97.7 (29 Jul 2022 04:54), Max: 98.2 (28 Jul 2022 17:07)  HR: 86 (29 Jul 2022 04:54) (66 - 86)  BP: 137/85 (29 Jul 2022 04:54) (118/75 - 137/85)  BP(mean): --  RR: 18 (29 Jul 2022 04:54) (17 - 18)  SpO2: 96% (29 Jul 2022 04:54) (93% - 100%)    Parameters below as of 29 Jul 2022 04:54  Patient On (Oxygen Delivery Method): room air        PHYSICAL EXAM:    GENERAL: NAD, AOX1  HEAD:  Atraumatic, Normocephalic  EYES, conjunctiva and sclera clear  ENMT: Moist mucous membranes  NECK: Supple  CHEST/LUNG: Clear to auscultation bilaterally; No rales, rhonchi, wheezing, or rubs  HEART: Regular rate and rhythm; No murmurs, rubs, or gallops  ABDOMEN: Soft, Suprapubic tenderness Nondistended; Bowel sounds present  EXTREMITIES:  2+ Peripheral Pulses, No clubbing, cyanosis, or edema        MEDICATIONS  (STANDING):  acetaminophen     Tablet .. 650 milliGRAM(s) Oral every 6 hours  dextrose 5%. 1000 milliLiter(s) (50 mL/Hr) IV Continuous <Continuous>  dextrose 5%. 1000 milliLiter(s) (100 mL/Hr) IV Continuous <Continuous>  dextrose 50% Injectable 25 Gram(s) IV Push once  dextrose 50% Injectable 12.5 Gram(s) IV Push once  dextrose 50% Injectable 25 Gram(s) IV Push once  glucagon  Injectable 1 milliGRAM(s) IntraMuscular once  insulin lispro (ADMELOG) corrective regimen sliding scale   SubCutaneous three times a day before meals  latanoprost 0.005% Ophthalmic Solution 1 Drop(s) Both EYES at bedtime  levothyroxine 50 MICROGram(s) Oral daily  sodium chloride 0.9%. 1000 milliLiter(s) (60 mL/Hr) IV Continuous <Continuous>  tamsulosin 0.8 milliGRAM(s) Oral at bedtime    MEDICATIONS  (PRN):  dextrose Oral Gel 15 Gram(s) Oral once PRN Blood Glucose LESS THAN 70 milliGRAM(s)/deciliter      Allergies    No Known Allergies    Intolerances          LABS:                          12.4   8.28  )-----------( 136      ( 29 Jul 2022 07:00 )             35.7     07-29    138  |  105  |  44.2<H>  ----------------------------<  83  4.1   |  19.0<L>  |  1.22    Ca    8.8      29 Jul 2022 07:00  Phos  3.0     07-29  Mg     1.7     07-29            RADIOLOGY & ADDITIONAL TESTS:

## 2022-07-29 NOTE — PROGRESS NOTE ADULT - ASSESSMENT
The patient is a 77 year old male with a past medical history of hypertension, diabetes mellitus, glaucoma and dementia who was brought to the ER after a witnessed fall at home.   In the ER, CT head, C spine and hip xrays were negative. Admitted to the CDU for MATT placement. Incidentally found to be COVID positive and needed inpatient admission for 5 days prior to be discharged to [facility]. Course complicated by hematuria, urinary retention and SYEDA.    Assessment/Plan:    1. Fall likely due to deconditioning in the setting of a viral infection  - Elevated CPK- IV hydration  - Fall precautions  -CT head and neck negative for acute pathology  -PT recs appreciated; will need MATT    2, Asymptomatic COVID 19 infection   -No respiratory symptoms or hypoxia  -Airborne isolation x 5 days     3, SYEDA  - Severe right hydronephrosis with hematuria  - IV hydration  - Bladder scan x 1 now, if elevated Hill placement  - Flomax Day 2    4. Acute Metabolic Encephalopathy due to COVID encephalopathy superimposed on dementia  -patient with forgetfulness and suspected dementia per H&P  - AAO x 0   -CT head negative   -UA negative for UTI  -Ammonia WNL    5. Hypertension  -BP stable   -Hold Losartan due to SYEDA      6. TYpe 2 DM   -HBA1C 5.7  - Ademlog SS     7. Glaucoma  -continue latanoprost gtt    8. Hypothyroidism  - follow up  TSH  -continue synthroid    DVT prophylaxis - Held for hematuria          The patient is a 77 year old male with a past medical history of hypertension, diabetes mellitus, glaucoma and dementia who was brought to the ER after a witnessed fall at home.   In the ER, CT head, C spine and hip xrays were negative. Admitted to the CDU for MATT placement. Incidentally found to be COVID positive and needed inpatient admission for 5 days prior to be discharged to MATT. Course complicated by hematuria, urinary retention and SYEDA.    Assessment/Plan:    1. Fall likely due to deconditioning in the setting of a viral infection  - Elevated CPK- IV hydration  - Fall precautions  -CT head and neck negative for acute pathology  -PT recs appreciated; will need MATT    2, Asymptomatic COVID 19 infection   -No respiratory symptoms or hypoxia  -Airborne isolation x 5 days     3, SYEDA  - Severe right hydronephrosis with hematuria  - SYEDA resolved with IV hydration   - Bladder scan x 1 with PVR Of 200  - Urology evaluation  - Flomax Day 2    4. Acute Metabolic Encephalopathy due to COVID encephalopathy superimposed on dementia  -patient with forgetfulness and suspected dementia per H&P  - AAO x 0   -CT head negative   -UA negative for UTI  -Ammonia WNL    5. Hypertension  -BP stable   -Hold Losartan due to SYEDA      6. TYpe 2 DM   -HBA1C 5.7  - Ademlog SS     7. Glaucoma  -continue latanoprost gtt    8. Hypothyroidism  -TSH WNL   -continue synthroid    DVT prophylaxis - Held for hematuria

## 2022-07-30 LAB
ANION GAP SERPL CALC-SCNC: 15 MMOL/L — SIGNIFICANT CHANGE UP (ref 5–17)
BUN SERPL-MCNC: 43.2 MG/DL — HIGH (ref 8–20)
CALCIUM SERPL-MCNC: 8.9 MG/DL — SIGNIFICANT CHANGE UP (ref 8.4–10.5)
CHLORIDE SERPL-SCNC: 104 MMOL/L — SIGNIFICANT CHANGE UP (ref 98–107)
CK SERPL-CCNC: 441 U/L — HIGH (ref 30–200)
CO2 SERPL-SCNC: 17 MMOL/L — LOW (ref 22–29)
CREAT SERPL-MCNC: 1.06 MG/DL — SIGNIFICANT CHANGE UP (ref 0.5–1.3)
EGFR: 72 ML/MIN/1.73M2 — SIGNIFICANT CHANGE UP
GLUCOSE BLDC GLUCOMTR-MCNC: 124 MG/DL — HIGH (ref 70–99)
GLUCOSE BLDC GLUCOMTR-MCNC: 129 MG/DL — HIGH (ref 70–99)
GLUCOSE BLDC GLUCOMTR-MCNC: 134 MG/DL — HIGH (ref 70–99)
GLUCOSE BLDC GLUCOMTR-MCNC: 138 MG/DL — HIGH (ref 70–99)
GLUCOSE SERPL-MCNC: 136 MG/DL — HIGH (ref 70–99)
POTASSIUM SERPL-MCNC: 3.8 MMOL/L — SIGNIFICANT CHANGE UP (ref 3.5–5.3)
POTASSIUM SERPL-SCNC: 3.8 MMOL/L — SIGNIFICANT CHANGE UP (ref 3.5–5.3)
SODIUM SERPL-SCNC: 136 MMOL/L — SIGNIFICANT CHANGE UP (ref 135–145)

## 2022-07-30 PROCEDURE — 99233 SBSQ HOSP IP/OBS HIGH 50: CPT | Mod: FS

## 2022-07-30 PROCEDURE — 99233 SBSQ HOSP IP/OBS HIGH 50: CPT

## 2022-07-30 RX ADMIN — Medication 50 MICROGRAM(S): at 05:18

## 2022-07-30 RX ADMIN — Medication 650 MILLIGRAM(S): at 12:33

## 2022-07-30 RX ADMIN — Medication 650 MILLIGRAM(S): at 05:18

## 2022-07-30 RX ADMIN — Medication 650 MILLIGRAM(S): at 05:48

## 2022-07-30 RX ADMIN — TAMSULOSIN HYDROCHLORIDE 0.8 MILLIGRAM(S): 0.4 CAPSULE ORAL at 21:33

## 2022-07-30 RX ADMIN — LATANOPROST 1 DROP(S): 0.05 SOLUTION/ DROPS OPHTHALMIC; TOPICAL at 21:32

## 2022-07-30 RX ADMIN — SODIUM CHLORIDE 60 MILLILITER(S): 9 INJECTION, SOLUTION INTRAVENOUS at 16:46

## 2022-07-30 RX ADMIN — Medication 650 MILLIGRAM(S): at 00:09

## 2022-07-30 RX ADMIN — SODIUM CHLORIDE 60 MILLILITER(S): 9 INJECTION, SOLUTION INTRAVENOUS at 21:34

## 2022-07-30 RX ADMIN — Medication 650 MILLIGRAM(S): at 13:01

## 2022-07-30 NOTE — PROGRESS NOTE ADULT - ASSESSMENT
The patient is a 77 year old male with a past medical history of hypertension, diabetes mellitus, glaucoma and dementia who was brought to the ER after a witnessed fall at home.   In the ER, CT head, C spine and hip xrays were negative. Admitted to the CDU for MATT placement. Incidentally found to be COVID positive and needed inpatient admission for 5 days prior to be discharged to [facility]. Course complicated by hematuria, urinary retention and SYEDA.    Assessment/Plan:    1. Fall likely due to deconditioning in the setting of a viral infection  - Elevated CPK- IV hydration- improving   - Fall precautions  -CT head and neck negative for acute pathology  -PT recs appreciated; will need MATT    2, Asymptomatic COVID 19 infection   -No respiratory symptoms or hypoxia  -Airborne isolation x 5 days     3, SYEDA  - Severe right hydronephrosis with hematuria  - SYEDA resolved with IV hydration   - Seen by urology, PVR <300; hold woodson for now  - Serial bladder scan  - hematuria improving   - Flomax Day 2    4. Acute Metabolic Encephalopathy due to COVID encephalopathy superimposed on dementia  -patient with forgetfulness and suspected dementia per H&P  - AAO x 0   -CT head negative   -UA negative for UTI  -Ammonia WNL    5. Hypertension  -BP stable   -Hold Losartan due to SYEDA      6. TYpe 2 DM   -HBA1C 5.7  - Ademlog SS     7. Glaucoma  -continue latanoprost gtt    8. Hypothyroidism  -TSH WNL   -continue synthroid    DVT prophylaxis - Held for hematuria

## 2022-07-30 NOTE — PROGRESS NOTE ADULT - ASSESSMENT
Assessment: 78 yo male with mild hematuria, right hydronephrosis, paraphimosis, SYEDA resolved.    Plan:  - paraphimosis reduced yesterday  - PVR's remain around 200 CC at baseline.   - no need for woodson at this time  - CT scan showed severe R hydro with UPJ obstruction that may possibly need surgery in the future, however can be dealt with in the outpatient setting.  - will sign off. Please have patient follow up with Dr. Valderrama as outpatient once discharge. Thank you.

## 2022-07-30 NOTE — PROGRESS NOTE ADULT - NS ATTEND AMEND GEN_ALL_CORE FT
agree with assessment and plan  discussed findings, no need for urgent  intervention  will follow up as outpatient

## 2022-07-30 NOTE — PROGRESS NOTE ADULT - SUBJECTIVE AND OBJECTIVE BOX
Subjective: Patient had CT scan performed yesterday which showed no renal/ureteral calculi, severe R hydro with suggestion of UPJ obstruction, mild L hydro and possible cystitis. Patient remains incontinent w/ approx 200 PVR.  Hematuria resolving.       STATUS POST:      POST OPERATIVE DAY #:     MEDICATIONS  (STANDING):  dextrose 5% + sodium chloride 0.45%. 1000 milliLiter(s) (60 mL/Hr) IV Continuous <Continuous>  dextrose 5%. 1000 milliLiter(s) (50 mL/Hr) IV Continuous <Continuous>  dextrose 5%. 1000 milliLiter(s) (100 mL/Hr) IV Continuous <Continuous>  dextrose 50% Injectable 25 Gram(s) IV Push once  dextrose 50% Injectable 12.5 Gram(s) IV Push once  dextrose 50% Injectable 25 Gram(s) IV Push once  glucagon  Injectable 1 milliGRAM(s) IntraMuscular once  insulin lispro (ADMELOG) corrective regimen sliding scale   SubCutaneous three times a day before meals  latanoprost 0.005% Ophthalmic Solution 1 Drop(s) Both EYES at bedtime  levothyroxine 50 MICROGram(s) Oral daily  tamsulosin 0.8 milliGRAM(s) Oral at bedtime    MEDICATIONS  (PRN):  dextrose Oral Gel 15 Gram(s) Oral once PRN Blood Glucose LESS THAN 70 milliGRAM(s)/deciliter      Vital Signs Last 24 Hrs  T(C): 36.7 (30 Jul 2022 13:08), Max: 36.7 (29 Jul 2022 16:24)  T(F): 98 (30 Jul 2022 13:08), Max: 98 (29 Jul 2022 16:24)  HR: 104 (30 Jul 2022 13:08) (88 - 104)  BP: 152/64 (30 Jul 2022 13:08) (140/72 - 152/64)  BP(mean): --  RR: 18 (30 Jul 2022 13:08) (18 - 18)  SpO2: 98% (30 Jul 2022 13:08) (95% - 99%)    Parameters below as of 30 Jul 2022 13:08  Patient On (Oxygen Delivery Method): room air            Physical Exam:    Constitutional: NAD  HEENT: PERRL, EOMI  Neck: No JVD, FROM without pain  Respiratory: Respirations non-labored, no accessory muscle use  Cardiovascular: Regular rate & rhythm  Gastrointestinal: Soft, non-tender, non-distended  Extremities: No peripheral edema, No cyanosis  Neurological: A&O x 3; without gross deficit  Musculoskeletal: No joint pain, swelling, deformity, or point tenderness; no limitation of movement      LABS:                        12.4   8.28  )-----------( 136      ( 29 Jul 2022 07:00 )             35.7     07-30    136  |  104  |  43.2<H>  ----------------------------<  136<H>  3.8   |  17.0<L>  |  1.06    Ca    8.9      30 Jul 2022 06:15  Phos  3.0     07-29  Mg     1.7     07-29           Subjective: Patient had CT scan performed yesterday which showed no renal/ureteral calculi, severe R hydro with suggestion of UPJ obstruction, mild L hydro and possible cystitis. Patient remains incontinent w/ approx 200 PVR.  Hematuria resolving. Creatinine improved.       MEDICATIONS  (STANDING):  dextrose 5% + sodium chloride 0.45%. 1000 milliLiter(s) (60 mL/Hr) IV Continuous <Continuous>  dextrose 5%. 1000 milliLiter(s) (50 mL/Hr) IV Continuous <Continuous>  dextrose 5%. 1000 milliLiter(s) (100 mL/Hr) IV Continuous <Continuous>  dextrose 50% Injectable 25 Gram(s) IV Push once  dextrose 50% Injectable 12.5 Gram(s) IV Push once  dextrose 50% Injectable 25 Gram(s) IV Push once  glucagon  Injectable 1 milliGRAM(s) IntraMuscular once  insulin lispro (ADMELOG) corrective regimen sliding scale   SubCutaneous three times a day before meals  latanoprost 0.005% Ophthalmic Solution 1 Drop(s) Both EYES at bedtime  levothyroxine 50 MICROGram(s) Oral daily  tamsulosin 0.8 milliGRAM(s) Oral at bedtime    MEDICATIONS  (PRN):  dextrose Oral Gel 15 Gram(s) Oral once PRN Blood Glucose LESS THAN 70 milliGRAM(s)/deciliter      Vital Signs Last 24 Hrs  T(C): 36.7 (30 Jul 2022 13:08), Max: 36.7 (29 Jul 2022 16:24)  T(F): 98 (30 Jul 2022 13:08), Max: 98 (29 Jul 2022 16:24)  HR: 104 (30 Jul 2022 13:08) (88 - 104)  BP: 152/64 (30 Jul 2022 13:08) (140/72 - 152/64)  BP(mean): --  RR: 18 (30 Jul 2022 13:08) (18 - 18)  SpO2: 98% (30 Jul 2022 13:08) (95% - 99%)    Parameters below as of 30 Jul 2022 13:08  Patient On (Oxygen Delivery Method): room air    Physical Exam:    Constitutional: NAD  HEENT: PERRL, EOMI  Neck: No JVD, FROM without pain  Respiratory: Respirations non-labored, no accessory muscle use  Cardiovascular: Regular rate & rhythm  Gastrointestinal: Soft, non-tender, non-distended  : Voiding spontaneously      LABS:                        12.4   8.28  )-----------( 136      ( 29 Jul 2022 07:00 )             35.7     07-30    136  |  104  |  43.2<H>  ----------------------------<  136<H>  3.8   |  17.0<L>  |  1.06    Ca    8.9      30 Jul 2022 06:15  Phos  3.0     07-29  Mg     1.7     07-29

## 2022-07-30 NOTE — PROGRESS NOTE ADULT - SUBJECTIVE AND OBJECTIVE BOX
CC: Follow up     INTERVAL HPI/OVERNIGHT EVENTS: Patient seen and examined, aox 1      Vital Signs Last 24 Hrs  T(C): 36.7 (30 Jul 2022 05:43), Max: 36.7 (29 Jul 2022 16:24)  T(F): 98 (30 Jul 2022 05:43), Max: 98 (29 Jul 2022 16:24)  HR: 104 (30 Jul 2022 05:43) (88 - 104)  BP: 144/77 (30 Jul 2022 05:43) (140/72 - 144/77)  BP(mean): --  RR: 18 (30 Jul 2022 05:43) (18 - 18)  SpO2: 99% (30 Jul 2022 05:43) (95% - 99%)    Parameters below as of 30 Jul 2022 05:43  Patient On (Oxygen Delivery Method): room air        PHYSICAL EXAM:    GENERAL: NAD, AOX1  HEAD:  Atraumatic, Normocephalic  EYES:conjunctiva and sclera clear  ENMT: Moist mucous membranes  NECK: Supple  CHEST/LUNG: Clear to auscultation bilaterally; No rales, rhonchi, wheezing, or rubs  HEART: Regular rate and rhythm; No murmurs, rubs, or gallops  ABDOMEN: Soft, Nontender, Nondistended; Bowel sounds present  EXTREMITIES:  2+ Peripheral Pulses, No clubbing, cyanosis, or edema        MEDICATIONS  (STANDING):  dextrose 5% + sodium chloride 0.45%. 1000 milliLiter(s) (60 mL/Hr) IV Continuous <Continuous>  dextrose 5%. 1000 milliLiter(s) (50 mL/Hr) IV Continuous <Continuous>  dextrose 5%. 1000 milliLiter(s) (100 mL/Hr) IV Continuous <Continuous>  dextrose 50% Injectable 25 Gram(s) IV Push once  dextrose 50% Injectable 12.5 Gram(s) IV Push once  dextrose 50% Injectable 25 Gram(s) IV Push once  glucagon  Injectable 1 milliGRAM(s) IntraMuscular once  insulin lispro (ADMELOG) corrective regimen sliding scale   SubCutaneous three times a day before meals  latanoprost 0.005% Ophthalmic Solution 1 Drop(s) Both EYES at bedtime  levothyroxine 50 MICROGram(s) Oral daily  tamsulosin 0.8 milliGRAM(s) Oral at bedtime    MEDICATIONS  (PRN):  dextrose Oral Gel 15 Gram(s) Oral once PRN Blood Glucose LESS THAN 70 milliGRAM(s)/deciliter      Allergies    No Known Allergies    Intolerances          LABS:                          12.4   8.28  )-----------( 136      ( 29 Jul 2022 07:00 )             35.7     07-30    136  |  104  |  43.2<H>  ----------------------------<  136<H>  3.8   |  17.0<L>  |  1.06    Ca    8.9      30 Jul 2022 06:15  Phos  3.0     07-29  Mg     1.7     07-29            RADIOLOGY & ADDITIONAL TESTS:

## 2022-07-31 LAB
ANION GAP SERPL CALC-SCNC: 16 MMOL/L — SIGNIFICANT CHANGE UP (ref 5–17)
APPEARANCE UR: ABNORMAL
BILIRUB UR-MCNC: NEGATIVE — SIGNIFICANT CHANGE UP
BUN SERPL-MCNC: 40.4 MG/DL — HIGH (ref 8–20)
CALCIUM SERPL-MCNC: 9 MG/DL — SIGNIFICANT CHANGE UP (ref 8.4–10.5)
CHLORIDE SERPL-SCNC: 105 MMOL/L — SIGNIFICANT CHANGE UP (ref 98–107)
CK SERPL-CCNC: 319 U/L — HIGH (ref 30–200)
CO2 SERPL-SCNC: 17 MMOL/L — LOW (ref 22–29)
COLOR SPEC: ABNORMAL
CREAT SERPL-MCNC: 1.04 MG/DL — SIGNIFICANT CHANGE UP (ref 0.5–1.3)
DIFF PNL FLD: ABNORMAL
EGFR: 74 ML/MIN/1.73M2 — SIGNIFICANT CHANGE UP
GLUCOSE BLDC GLUCOMTR-MCNC: 104 MG/DL — HIGH (ref 70–99)
GLUCOSE BLDC GLUCOMTR-MCNC: 118 MG/DL — HIGH (ref 70–99)
GLUCOSE BLDC GLUCOMTR-MCNC: 136 MG/DL — HIGH (ref 70–99)
GLUCOSE BLDC GLUCOMTR-MCNC: 141 MG/DL — HIGH (ref 70–99)
GLUCOSE SERPL-MCNC: 138 MG/DL — HIGH (ref 70–99)
GLUCOSE UR QL: NEGATIVE — SIGNIFICANT CHANGE UP
HCT VFR BLD CALC: 34.9 % — LOW (ref 39–50)
HGB BLD-MCNC: 11.8 G/DL — LOW (ref 13–17)
KETONES UR-MCNC: ABNORMAL
LEUKOCYTE ESTERASE UR-ACNC: ABNORMAL
MCHC RBC-ENTMCNC: 30.3 PG — SIGNIFICANT CHANGE UP (ref 27–34)
MCHC RBC-ENTMCNC: 33.8 GM/DL — SIGNIFICANT CHANGE UP (ref 32–36)
MCV RBC AUTO: 89.5 FL — SIGNIFICANT CHANGE UP (ref 80–100)
NITRITE UR-MCNC: POSITIVE
PH UR: 8 — SIGNIFICANT CHANGE UP (ref 5–8)
PLATELET # BLD AUTO: 132 K/UL — LOW (ref 150–400)
POTASSIUM SERPL-MCNC: 3.7 MMOL/L — SIGNIFICANT CHANGE UP (ref 3.5–5.3)
POTASSIUM SERPL-SCNC: 3.7 MMOL/L — SIGNIFICANT CHANGE UP (ref 3.5–5.3)
PROT UR-MCNC: 100
RBC # BLD: 3.9 M/UL — LOW (ref 4.2–5.8)
RBC # FLD: 13.7 % — SIGNIFICANT CHANGE UP (ref 10.3–14.5)
SODIUM SERPL-SCNC: 138 MMOL/L — SIGNIFICANT CHANGE UP (ref 135–145)
SP GR SPEC: 1.01 — SIGNIFICANT CHANGE UP (ref 1.01–1.02)
UROBILINOGEN FLD QL: NEGATIVE — SIGNIFICANT CHANGE UP
WBC # BLD: 8.42 K/UL — SIGNIFICANT CHANGE UP (ref 3.8–10.5)
WBC # FLD AUTO: 8.42 K/UL — SIGNIFICANT CHANGE UP (ref 3.8–10.5)

## 2022-07-31 PROCEDURE — 99233 SBSQ HOSP IP/OBS HIGH 50: CPT

## 2022-07-31 RX ORDER — ENOXAPARIN SODIUM 100 MG/ML
40 INJECTION SUBCUTANEOUS EVERY 24 HOURS
Refills: 0 | Status: DISCONTINUED | OUTPATIENT
Start: 2022-07-31 | End: 2022-08-05

## 2022-07-31 RX ORDER — LIDOCAINE HCL 20 MG/ML
5 VIAL (ML) INJECTION ONCE
Refills: 0 | Status: COMPLETED | OUTPATIENT
Start: 2022-07-31 | End: 2022-07-31

## 2022-07-31 RX ORDER — SENNA PLUS 8.6 MG/1
2 TABLET ORAL AT BEDTIME
Refills: 0 | Status: DISCONTINUED | OUTPATIENT
Start: 2022-07-31 | End: 2022-08-05

## 2022-07-31 RX ORDER — ACETAMINOPHEN 500 MG
650 TABLET ORAL ONCE
Refills: 0 | Status: COMPLETED | OUTPATIENT
Start: 2022-07-31 | End: 2022-07-31

## 2022-07-31 RX ORDER — POLYETHYLENE GLYCOL 3350 17 G/17G
17 POWDER, FOR SOLUTION ORAL DAILY
Refills: 0 | Status: DISCONTINUED | OUTPATIENT
Start: 2022-07-31 | End: 2022-08-05

## 2022-07-31 RX ADMIN — TAMSULOSIN HYDROCHLORIDE 0.8 MILLIGRAM(S): 0.4 CAPSULE ORAL at 21:11

## 2022-07-31 RX ADMIN — SENNA PLUS 2 TABLET(S): 8.6 TABLET ORAL at 21:11

## 2022-07-31 RX ADMIN — ENOXAPARIN SODIUM 40 MILLIGRAM(S): 100 INJECTION SUBCUTANEOUS at 21:11

## 2022-07-31 RX ADMIN — Medication 650 MILLIGRAM(S): at 23:04

## 2022-07-31 RX ADMIN — Medication 5 MILLILITER(S): at 18:28

## 2022-07-31 RX ADMIN — Medication 50 MICROGRAM(S): at 05:10

## 2022-07-31 RX ADMIN — SODIUM CHLORIDE 60 MILLILITER(S): 9 INJECTION, SOLUTION INTRAVENOUS at 23:04

## 2022-07-31 RX ADMIN — LATANOPROST 1 DROP(S): 0.05 SOLUTION/ DROPS OPHTHALMIC; TOPICAL at 21:11

## 2022-07-31 NOTE — PROGRESS NOTE ADULT - ASSESSMENT
The patient is a 77 year old male with a past medical history of hypertension, diabetes mellitus, glaucoma and dementia who was brought to the ER after a witnessed fall at home.   In the ER, CT head, C spine and hip xrays were negative. Admitted to the CDU for MATT placement. Incidentally found to be COVID positive and needed inpatient admission for 5 days prior to be discharged to [facility]. Course complicated by hematuria, urinary retention and SYEDA.    Assessment/Plan:    1. Fall likely due to deconditioning in the setting of a viral infection  - Elevated CPK- IV hydration- improving   - Fall precautions  -CT head and neck negative for acute pathology  -PT recs appreciated; will need MATT    2, Asymptomatic COVID 19 infection   -No respiratory symptoms or hypoxia  -Airborne isolation x 5 days     3, SYEDA  - Severe right hydronephrosis with hematuria  - SYEDA resolved with IV hydration   - Seen by urology, PVR <300; hold woodson for now  - Serial bladder scan  - hematuria improving   - Flomax     4. Acute Metabolic Encephalopathy due to COVID encephalopathy superimposed on dementia  -patient with forgetfulness and suspected dementia per H&P  - AAO x 0   -CT head negative   -UA negative for UTI  -Ammonia WNL    5. Hypertension  -BP stable   -Hold Losartan due to SYEDA      6. TYpe 2 DM   -HBA1C 5.7  - Ademlog SS     7. Glaucoma  -continue latanoprost gtt    8. Hypothyroidism  -TSH WNL   -continue synthroid    DVT prophylaxis - Lovenox subcut

## 2022-07-31 NOTE — PROGRESS NOTE ADULT - SUBJECTIVE AND OBJECTIVE BOX
CC Follow up     INTERVAL HPI/OVERNIGHT EVENTS: Patient seen and examined, afberile overnight. Awake but aox 1 poor historian     Vital Signs Last 24 Hrs  T(C): 36.3 (31 Jul 2022 05:13), Max: 36.9 (30 Jul 2022 19:48)  T(F): 97.4 (31 Jul 2022 05:13), Max: 98.5 (30 Jul 2022 19:48)  HR: 98 (31 Jul 2022 05:13) (98 - 104)  BP: 138/83 (31 Jul 2022 05:13) (138/83 - 152/79)  BP(mean): --  RR: 18 (31 Jul 2022 05:13) (18 - 18)  SpO2: 98% (31 Jul 2022 05:13) (95% - 98%)    Parameters below as of 31 Jul 2022 05:13  Patient On (Oxygen Delivery Method): room air        PHYSICAL EXAM:    GENERAL: NAD,AOX1  HEAD:  Atraumatic, Normocephalic  EYES:  conjunctiva and sclera clear  ENMT: Moist mucous membranes  NECK: Supple  CHEST/LUNG: Clear to auscultation bilaterally; No rales, rhonchi, wheezing, or rubs  HEART: Regular rate and rhythm; No murmurs, rubs, or gallops  ABDOMEN: Soft, Nontender, Nondistended; Bowel sounds present  EXTREMITIES:  2+ Peripheral Pulses, No clubbing, cyanosis, or edema        MEDICATIONS  (STANDING):  dextrose 5% + sodium chloride 0.45%. 1000 milliLiter(s) (60 mL/Hr) IV Continuous <Continuous>  dextrose 5%. 1000 milliLiter(s) (50 mL/Hr) IV Continuous <Continuous>  dextrose 5%. 1000 milliLiter(s) (100 mL/Hr) IV Continuous <Continuous>  dextrose 50% Injectable 25 Gram(s) IV Push once  dextrose 50% Injectable 12.5 Gram(s) IV Push once  dextrose 50% Injectable 25 Gram(s) IV Push once  glucagon  Injectable 1 milliGRAM(s) IntraMuscular once  insulin lispro (ADMELOG) corrective regimen sliding scale   SubCutaneous three times a day before meals  latanoprost 0.005% Ophthalmic Solution 1 Drop(s) Both EYES at bedtime  levothyroxine 50 MICROGram(s) Oral daily  tamsulosin 0.8 milliGRAM(s) Oral at bedtime    MEDICATIONS  (PRN):  dextrose Oral Gel 15 Gram(s) Oral once PRN Blood Glucose LESS THAN 70 milliGRAM(s)/deciliter      Allergies    No Known Allergies    Intolerances          LABS:                          11.8   8.42  )-----------( 132      ( 31 Jul 2022 07:40 )             34.9     07-31    138  |  105  |  40.4<H>  ----------------------------<  138<H>  3.7   |  17.0<L>  |  1.04    Ca    9.0      31 Jul 2022 07:40            RADIOLOGY & ADDITIONAL TESTS:

## 2022-08-01 ENCOUNTER — TRANSCRIPTION ENCOUNTER (OUTPATIENT)
Age: 77
End: 2022-08-01

## 2022-08-01 DIAGNOSIS — E03.9 HYPOTHYROIDISM, UNSPECIFIED: ICD-10-CM

## 2022-08-01 DIAGNOSIS — I10 ESSENTIAL (PRIMARY) HYPERTENSION: ICD-10-CM

## 2022-08-01 DIAGNOSIS — E11.9 TYPE 2 DIABETES MELLITUS WITHOUT COMPLICATIONS: ICD-10-CM

## 2022-08-01 DIAGNOSIS — H40.9 UNSPECIFIED GLAUCOMA: ICD-10-CM

## 2022-08-01 LAB
ALBUMIN SERPL ELPH-MCNC: 3.4 G/DL — SIGNIFICANT CHANGE UP (ref 3.3–5.2)
ALP SERPL-CCNC: 61 U/L — SIGNIFICANT CHANGE UP (ref 40–120)
ALT FLD-CCNC: 21 U/L — SIGNIFICANT CHANGE UP
ANION GAP SERPL CALC-SCNC: 15 MMOL/L — SIGNIFICANT CHANGE UP (ref 5–17)
AST SERPL-CCNC: 25 U/L — SIGNIFICANT CHANGE UP
BILIRUB SERPL-MCNC: 0.5 MG/DL — SIGNIFICANT CHANGE UP (ref 0.4–2)
BUN SERPL-MCNC: 37.9 MG/DL — HIGH (ref 8–20)
CALCIUM SERPL-MCNC: 8.7 MG/DL — SIGNIFICANT CHANGE UP (ref 8.4–10.5)
CHLORIDE SERPL-SCNC: 107 MMOL/L — SIGNIFICANT CHANGE UP (ref 98–107)
CK SERPL-CCNC: 170 U/L — SIGNIFICANT CHANGE UP (ref 30–200)
CO2 SERPL-SCNC: 17 MMOL/L — LOW (ref 22–29)
CREAT SERPL-MCNC: 1.07 MG/DL — SIGNIFICANT CHANGE UP (ref 0.5–1.3)
EGFR: 71 ML/MIN/1.73M2 — SIGNIFICANT CHANGE UP
GLUCOSE BLDC GLUCOMTR-MCNC: 149 MG/DL — HIGH (ref 70–99)
GLUCOSE BLDC GLUCOMTR-MCNC: 151 MG/DL — HIGH (ref 70–99)
GLUCOSE BLDC GLUCOMTR-MCNC: 164 MG/DL — HIGH (ref 70–99)
GLUCOSE BLDC GLUCOMTR-MCNC: 178 MG/DL — HIGH (ref 70–99)
GLUCOSE SERPL-MCNC: 156 MG/DL — HIGH (ref 70–99)
HCT VFR BLD CALC: 33.4 % — LOW (ref 39–50)
HGB BLD-MCNC: 11.8 G/DL — LOW (ref 13–17)
MCHC RBC-ENTMCNC: 31.8 PG — SIGNIFICANT CHANGE UP (ref 27–34)
MCHC RBC-ENTMCNC: 35.3 GM/DL — SIGNIFICANT CHANGE UP (ref 32–36)
MCV RBC AUTO: 90 FL — SIGNIFICANT CHANGE UP (ref 80–100)
PLATELET # BLD AUTO: 132 K/UL — LOW (ref 150–400)
POTASSIUM SERPL-MCNC: 3.2 MMOL/L — LOW (ref 3.5–5.3)
POTASSIUM SERPL-SCNC: 3.2 MMOL/L — LOW (ref 3.5–5.3)
PROT SERPL-MCNC: 6.1 G/DL — LOW (ref 6.6–8.7)
RBC # BLD: 3.71 M/UL — LOW (ref 4.2–5.8)
RBC # FLD: 13.8 % — SIGNIFICANT CHANGE UP (ref 10.3–14.5)
SARS-COV-2 RNA SPEC QL NAA+PROBE: DETECTED
SODIUM SERPL-SCNC: 139 MMOL/L — SIGNIFICANT CHANGE UP (ref 135–145)
WBC # BLD: 6.72 K/UL — SIGNIFICANT CHANGE UP (ref 3.8–10.5)
WBC # FLD AUTO: 6.72 K/UL — SIGNIFICANT CHANGE UP (ref 3.8–10.5)

## 2022-08-01 PROCEDURE — 99233 SBSQ HOSP IP/OBS HIGH 50: CPT

## 2022-08-01 RX ORDER — ACETAMINOPHEN 500 MG
650 TABLET ORAL ONCE
Refills: 0 | Status: COMPLETED | OUTPATIENT
Start: 2022-08-01 | End: 2022-08-01

## 2022-08-01 RX ORDER — SODIUM BICARBONATE 1 MEQ/ML
650 SYRINGE (ML) INTRAVENOUS
Refills: 0 | Status: DISCONTINUED | OUTPATIENT
Start: 2022-08-01 | End: 2022-08-05

## 2022-08-01 RX ORDER — POTASSIUM CHLORIDE 20 MEQ
40 PACKET (EA) ORAL ONCE
Refills: 0 | Status: COMPLETED | OUTPATIENT
Start: 2022-08-01 | End: 2022-08-01

## 2022-08-01 RX ORDER — CEFTRIAXONE 500 MG/1
1000 INJECTION, POWDER, FOR SOLUTION INTRAMUSCULAR; INTRAVENOUS EVERY 24 HOURS
Refills: 0 | Status: COMPLETED | OUTPATIENT
Start: 2022-08-01 | End: 2022-08-03

## 2022-08-01 RX ORDER — LANOLIN ALCOHOL/MO/W.PET/CERES
3 CREAM (GRAM) TOPICAL AT BEDTIME
Refills: 0 | Status: DISCONTINUED | OUTPATIENT
Start: 2022-08-01 | End: 2022-08-05

## 2022-08-01 RX ORDER — ACETAMINOPHEN 500 MG
650 TABLET ORAL EVERY 6 HOURS
Refills: 0 | Status: DISCONTINUED | OUTPATIENT
Start: 2022-08-01 | End: 2022-08-05

## 2022-08-01 RX ADMIN — CEFTRIAXONE 100 MILLIGRAM(S): 500 INJECTION, POWDER, FOR SOLUTION INTRAMUSCULAR; INTRAVENOUS at 15:25

## 2022-08-01 RX ADMIN — Medication 3 MILLIGRAM(S): at 23:59

## 2022-08-01 RX ADMIN — LATANOPROST 1 DROP(S): 0.05 SOLUTION/ DROPS OPHTHALMIC; TOPICAL at 21:58

## 2022-08-01 RX ADMIN — Medication 50 MICROGRAM(S): at 05:48

## 2022-08-01 RX ADMIN — ENOXAPARIN SODIUM 40 MILLIGRAM(S): 100 INJECTION SUBCUTANEOUS at 21:58

## 2022-08-01 RX ADMIN — Medication 2: at 09:01

## 2022-08-01 RX ADMIN — SENNA PLUS 2 TABLET(S): 8.6 TABLET ORAL at 21:57

## 2022-08-01 RX ADMIN — Medication 650 MILLIGRAM(S): at 05:48

## 2022-08-01 RX ADMIN — TAMSULOSIN HYDROCHLORIDE 0.8 MILLIGRAM(S): 0.4 CAPSULE ORAL at 21:57

## 2022-08-01 RX ADMIN — Medication 40 MILLIEQUIVALENT(S): at 08:54

## 2022-08-01 RX ADMIN — Medication 650 MILLIGRAM(S): at 00:15

## 2022-08-01 RX ADMIN — Medication 650 MILLIGRAM(S): at 06:44

## 2022-08-01 RX ADMIN — Medication 2: at 17:32

## 2022-08-01 RX ADMIN — Medication 650 MILLIGRAM(S): at 17:38

## 2022-08-01 NOTE — DISCHARGE NOTE PROVIDER - PROVIDER TOKENS
FREE:[LAST:[Primary Care Doctor],PHONE:[(   )    -],FAX:[(   )    -]],PROVIDER:[TOKEN:[[id number]]]

## 2022-08-01 NOTE — DIETITIAN INITIAL EVALUATION ADULT - ADD RECOMMEND
RECOMMENDATIONS:  1) RX: MVI, Zinc, Vitamin C daily  2) Glucerna BID (440kcal,20gpro) to assist in meeting kcal/pro needs RECOMMENDATIONS:  1) RX: MVI, Zinc, Vitamin C daily  2) Glucerna BID (440kcal,20gpro) to assist in meeting kcal/pro needs  3) monitor PO intake; monitor wts; monitor labs

## 2022-08-01 NOTE — DISCHARGE NOTE PROVIDER - CARE PROVIDER_API CALL
Primary Care Doctor,   Phone: (   )    -  Fax: (   )    -  Follow Up Time:     Dann Valderrama)  Urology  [address], [place] [address]  Phone: [phone]  Fax: [phone]  Follow Up Time:

## 2022-08-01 NOTE — DIETITIAN INITIAL EVALUATION ADULT - OTHER INFO
77 year old male with a history of HTN, DM, and Glaucoma presented to [facility] ED after a witnessed fall at home. In the ED, CTH/C-spine no acute processes. Hip XR also neg for any fractures or injury. The patient was evaluated by our Physical Therapy team, and it was recommended that they be discharged MATT. He was found to be Covid positive. Patient was admitted for COVID infection requiring 5-day isolation for  MATT placement. Hospital course complicated by SYEDA, urinary retention and hematuria. US renal noted for Severe R hydronephrosis. Urology consulted, no need for Hill for decompression. Patient's hematuria and SYEDA have improved. CT renal stone hunt negative for kidney stone, but did note UPJ obstruction. Per , patient can follow up outpatient for further work up.

## 2022-08-01 NOTE — DIETITIAN INITIAL EVALUATION ADULT - PERTINENT LABORATORY DATA
08-01    139  |  107  |  37.9<H>  ----------------------------<  156<H>  3.2<L>   |  17.0<L>  |  1.07    Ca    8.7      01 Aug 2022 05:48    TPro  6.1<L>  /  Alb  3.4  /  TBili  0.5  /  DBili  x   /  AST  25  /  ALT  21  /  AlkPhos  61  08-01  POCT Blood Glucose.: 164 mg/dL (08-01-22 @ 08:53)  A1C with Estimated Average Glucose Result: 5.7 % (07-28-22 @ 05:47)    08-01 Na139 mmol/L Glu 156 mg/dL<H> K+ 3.2 mmol/L<L> Cr  1.07 mg/dL BUN 37.9 mg/dL<H> Phos n/a   Alb 3.4 g/dL PAB n/a

## 2022-08-01 NOTE — DIETITIAN INITIAL EVALUATION ADULT - PERTINENT MEDS FT
MEDICATIONS  (STANDING):  dextrose 5% + sodium chloride 0.45%. 1000 milliLiter(s) (60 mL/Hr) IV Continuous <Continuous>  dextrose 5%. 1000 milliLiter(s) (50 mL/Hr) IV Continuous <Continuous>  dextrose 5%. 1000 milliLiter(s) (100 mL/Hr) IV Continuous <Continuous>  dextrose 50% Injectable 25 Gram(s) IV Push once  dextrose 50% Injectable 12.5 Gram(s) IV Push once  dextrose 50% Injectable 25 Gram(s) IV Push once  enoxaparin Injectable 40 milliGRAM(s) SubCutaneous every 24 hours  glucagon  Injectable 1 milliGRAM(s) IntraMuscular once  insulin lispro (ADMELOG) corrective regimen sliding scale   SubCutaneous three times a day before meals  latanoprost 0.005% Ophthalmic Solution 1 Drop(s) Both EYES at bedtime  levothyroxine 50 MICROGram(s) Oral daily  polyethylene glycol 3350 17 Gram(s) Oral daily  senna 2 Tablet(s) Oral at bedtime  tamsulosin 0.8 milliGRAM(s) Oral at bedtime    MEDICATIONS  (PRN):  dextrose Oral Gel 15 Gram(s) Oral once PRN Blood Glucose LESS THAN 70 milliGRAM(s)/deciliter

## 2022-08-01 NOTE — DISCHARGE NOTE PROVIDER - HOSPITAL COURSE
77 year old male with a history of HTN, DM, and Glaucoma presented to [facility] ED after a witnessed fall at home. In the ED, CTH/C-spine no acute processes. Hip XR also neg for any fractures or injury. The patient was evaluated by our Physical Therapy team, and it was recommended that they be discharged M[facility]. He was found to be Covid positive. Patient was admitted for COVID infection requiring 5-day isolation for  MATT placement. Hospital course complicated by SYEDA, urinary retention and hematuria. US renal noted for Severe R hydronephrosis. Urology consulted, no need for Hill for decompression. Patient's hematuria and SYEDA have improved. CT renal stone hunt negative for kidney stone, but did note UPJ obstruction. Per , patient can follow up outpatient for further work up.     At this time, patient is medically stable for discharge    Length of discharge:       77 year old male with a history of HTN, DM, and Glaucoma presented to [facility] ED after a witnessed fall at home. In the ED, CTH/C-spine no acute processes. Hip XR also neg for any fractures or injury. The patient was evaluated by our Physical Therapy team, and it was recommended that they be discharged [facility]. He was found to be Covid positive. Patient was admitted for COVID infection requiring 5-day isolation for  MATT placement. Hospital course complicated by SYEDA, urinary retention and hematuria. US renal noted for Severe R hydronephrosis. Urology consulted, no need for Hill for decompression. Patient's hematuria and SYEDA have improved. CT renal stone hunt negative for kidney stone, but did note UPJ obstruction. Per , patient can follow up outpatient for further work up.     At this time, patient is medically stable for discharge    Length of discharge: 40min       77 year old male with a history of HTN, DM, and Glaucoma presented to [facility] ED after a witnessed fall at home. In the ED, CTH/C-spine no acute processes. Hip XR also neg for any fractures or injury. The patient was evaluated by our Physical Therapy team, and it was recommended that they be discharged [facility]. He was found to be Covid positive. Patient was admitted for COVID infection requiring 5-day isolation for  MATT placement. Hospital course complicated by SYEDA, urinary retention and hematuria. US renal noted for Severe R hydronephrosis. Urology consulted, no need for Hill for decompression. Patient's hematuria and SYEDA have improved. CT renal stone hunt negative for kidney stone, but did note UPJ obstruction. Per , patient can follow up outpatient for further work up.     At this time, patient is medically stable for discharge

## 2022-08-01 NOTE — DIETITIAN INITIAL EVALUATION ADULT - ORAL INTAKE PTA/DIET HISTORY
Nutrition assessment attempted, patient too lethargic for interview at this time. Per documentation, patient admitted with disorientation and fall at home, now dx Glaucoma, Hypothyroidism, HTN, DM, SYEDA and acute metabolic encephalopathy. Currently COVID+. Lives with wife at home. Observation shows breakfast tray untouched at bedside. Unable to determine PO intake and appetite status. Some physical signs of malnutrition noted upon observation as well. Unable to determine height. Labs show K3.2L, BUN37.9H, Ulg871H, and Na+139WNL. Receiving IV fluids to maintain hydration status. At this time, plan for discharge to [facility] s/p COVID isolation. RD to remain available.

## 2022-08-01 NOTE — PROGRESS NOTE ADULT - SUBJECTIVE AND OBJECTIVE BOX
CC: Follow up     INTERVAL HPI/OVERNIGHT EVENTS: Patient seen and examined, no acute events overnight. Not requiring oxygen.       Vital Signs Last 24 Hrs  T(C): 37 (01 Aug 2022 05:09), Max: 37 (01 Aug 2022 05:09)  T(F): 98.6 (01 Aug 2022 05:09), Max: 98.6 (01 Aug 2022 05:09)  HR: 98 (01 Aug 2022 05:09) (98 - 105)  BP: 140/78 (01 Aug 2022 05:09) (140/78 - 160/88)  BP(mean): --  RR: 18 (01 Aug 2022 05:09) (18 - 18)  SpO2: 98% (01 Aug 2022 05:09) (96% - 98%)    Parameters below as of 01 Aug 2022 05:09  Patient On (Oxygen Delivery Method): room air        PHYSICAL EXAM:    GENERAL: NAD, AOX1  HEAD:  Atraumatic, Normocephalic  EYES:  conjunctiva and sclera clear  ENMT: Moist mucous membranes  NECK: Supple  CHEST/LUNG: Clear to auscultation bilaterally; No rales, rhonchi, wheezing, or rubs  HEART: Regular rate and rhythm; No murmurs, rubs, or gallops  ABDOMEN: Soft, Nontender, Nondistended; Bowel sounds present  EXTREMITIES:  2+ Peripheral Pulses, No clubbing, cyanosis, or edema        MEDICATIONS  (STANDING):  dextrose 5% + sodium chloride 0.45%. 1000 milliLiter(s) (60 mL/Hr) IV Continuous <Continuous>  dextrose 5%. 1000 milliLiter(s) (50 mL/Hr) IV Continuous <Continuous>  dextrose 5%. 1000 milliLiter(s) (100 mL/Hr) IV Continuous <Continuous>  dextrose 50% Injectable 25 Gram(s) IV Push once  dextrose 50% Injectable 12.5 Gram(s) IV Push once  dextrose 50% Injectable 25 Gram(s) IV Push once  enoxaparin Injectable 40 milliGRAM(s) SubCutaneous every 24 hours  glucagon  Injectable 1 milliGRAM(s) IntraMuscular once  insulin lispro (ADMELOG) corrective regimen sliding scale   SubCutaneous three times a day before meals  latanoprost 0.005% Ophthalmic Solution 1 Drop(s) Both EYES at bedtime  levothyroxine 50 MICROGram(s) Oral daily  polyethylene glycol 3350 17 Gram(s) Oral daily  senna 2 Tablet(s) Oral at bedtime  tamsulosin 0.8 milliGRAM(s) Oral at bedtime    MEDICATIONS  (PRN):  dextrose Oral Gel 15 Gram(s) Oral once PRN Blood Glucose LESS THAN 70 milliGRAM(s)/deciliter      Allergies    No Known Allergies    Intolerances          LABS:                          11.8   6.72  )-----------( 132      ( 01 Aug 2022 05:48 )             33.4     08    139  |  107  |  37.9<H>  ----------------------------<  156<H>  3.2<L>   |  17.0<L>  |  1.07    Ca    8.7      01 Aug 2022 05:48    TPro  6.1<L>  /  Alb  3.4  /  TBili  0.5  /  DBili  x   /  AST  25  /  ALT  21  /  AlkPhos  61  08-      Urinalysis Basic - ( 2022 19:50 )    Color: Brown / Appearance: Slightly Turbid / S.010 / pH: x  Gluc: x / Ketone: Moderate  / Bili: Negative / Urobili: Negative   Blood: x / Protein: 100 / Nitrite: Positive   Leuk Esterase: Moderate / RBC: >50 /HPF / WBC >50 /HPF   Sq Epi: x / Non Sq Epi: Occasional / Bacteria: TNTC        RADIOLOGY & ADDITIONAL TESTS:

## 2022-08-01 NOTE — DISCHARGE NOTE PROVIDER - NSDCCPCAREPLAN_GEN_ALL_CORE_FT
PRINCIPAL DISCHARGE DIAGNOSIS  Diagnosis: Fall at home  Assessment and Plan of Treatment: our PT team is recommending you go to a rehab      SECONDARY DISCHARGE DIAGNOSES  Diagnosis: SYEDA (acute kidney injury)  Assessment and Plan of Treatment: has now resolved after IV fluid hydration    Diagnosis: Acute metabolic encephalopathy  Assessment and Plan of Treatment:      PRINCIPAL DISCHARGE DIAGNOSIS  Diagnosis: Fall at home  Assessment and Plan of Treatment: you were evaluated by PT and they are recommending MATT      SECONDARY DISCHARGE DIAGNOSES  Diagnosis: SYEDA (acute kidney injury)  Assessment and Plan of Treatment: During your hospitalization, you were found to have a acute kidney injury with a severe hydronephrodid. You were treated with IV fluids and your renal function was monitored and has now normalized.  please follow up with your primary care doctor upon discharge.    Diagnosis: Acute metabolic encephalopathy  Assessment and Plan of Treatment:     Diagnosis: Pyuria  Assessment and Plan of Treatment: you completed 3 day course of rocephin    Diagnosis: COVID  Assessment and Plan of Treatment: asymptmatic. you completed several days of isolation     PRINCIPAL DISCHARGE DIAGNOSIS  Diagnosis: Fall at home  Assessment and Plan of Treatment: you were evaluated by PT and they are recommending MATT      SECONDARY DISCHARGE DIAGNOSES  Diagnosis: SYEDA (acute kidney injury)  Assessment and Plan of Treatment: During your hospitalization, you were found to have a acute kidney injury with a severe hydronephrodid. You were treated with IV fluids and your renal function was monitored and has now normalized.  please follow up with your primary care doctor upon discharge.    Diagnosis: Acute metabolic encephalopathy  Assessment and Plan of Treatment: Secondary to infection improved  AOX1 at baseline    Diagnosis: Pyuria  Assessment and Plan of Treatment: you completed 3 day course of rocephin    Diagnosis: COVID  Assessment and Plan of Treatment: asymptmatic. you completed several days of isolation     PRINCIPAL DISCHARGE DIAGNOSIS  Diagnosis: Fall at home  Assessment and Plan of Treatment: you were evaluated by PT and they are recommending MATT      SECONDARY DISCHARGE DIAGNOSES  Diagnosis: SYEDA (acute kidney injury)  Assessment and Plan of Treatment: During your hospitalization, you were found to have a acute kidney injury with a severe hydronephrodid. You were treated with IV fluids and your renal function was monitored and has now normalized.  please follow up with your primary care doctor upon discharge.    Diagnosis: Acute metabolic encephalopathy  Assessment and Plan of Treatment: Secondary to infection improved  AOX1 at baseline    Diagnosis: Pyuria  Assessment and Plan of Treatment: you completed 3 day course of rocephin    Diagnosis: COVID  Assessment and Plan of Treatment: asymptmatic  Aspiin 81 mg PO OD x 30 days COVID VTE prophylaxis

## 2022-08-01 NOTE — PROGRESS NOTE ADULT - ASSESSMENT
The patient is a 77 year old male with a past medical history of hypertension, diabetes mellitus, glaucoma and dementia who was brought to the ER after a witnessed fall at home.   In the ER, CT head, C spine and hip xrays were negative. Admitted to the CDU for MATT placement. Incidentally found to be COVID positive and needed inpatient admission for 5 days prior to be discharged to [facility]. Course complicated by hematuria, urinary retention and SYEDA.    Assessment/Plan:    1. Fall likely due to deconditioning in the setting of a viral infection  - Elevated CPK- IV hydration- improving   - Fall precautions  -CT head and neck negative for acute pathology  -PT recs appreciated; will need MATT    2, Asymptomatic COVID 19 infection   -No respiratory symptoms or hypoxia  -Airborne isolation x 5 days completed today  - Repeat COVID for MATT placement     3, SYEDA  - Severe right hydronephrosis with hematuria  =Positive UA; Straight cath x 1 for urine culture   - SYEDA resolved with IV hydration   - Seen by urology, PVR <300; hold woodson for now  - Serial bladder scan  - Flomax     4. Acute Metabolic Encephalopathy due to COVID encephalopathy superimposed on dementia  -patient with forgetfulness and suspected dementia per H&P  - AAO x 0   -CT head negative   -UA negative for UTI  -Ammonia WNL    5. Hypertension  -BP stable   -Hold Losartan due to SYEDA      6. TYpe 2 DM   -HBA1C 5.7  - Ademlog SS     7. Glaucoma  -continue latanoprost gtt    8. Hypothyroidism  -TSH WNL   -continue synthroid    DVT prophylaxis - Lovenox subcut     Plan for MATT placement in 24-48 hours. Spoke with wife and she is in agreement

## 2022-08-01 NOTE — DIETITIAN INITIAL EVALUATION ADULT - NSICDXPASTMEDICALHX_GEN_ALL_CORE_FT
PAST MEDICAL HISTORY:  DM (diabetes mellitus)     Glaucoma     HTN (hypertension)     Hypothyroid

## 2022-08-01 NOTE — DISCHARGE NOTE PROVIDER - NSDCMRMEDTOKEN_GEN_ALL_CORE_FT
latanoprost 0.005% ophthalmic solution: 1 drop(s) to each affected eye once a day (in the evening)  levothyroxine 50 mcg (0.05 mg) oral tablet: 1 tab(s) orally once a day  losartan 25 mg oral tablet: 1 tab(s) orally once a day  lovastatin 20 mg oral tablet: 1 tab(s) orally once a day  metFORMIN 500 mg oral tablet: 1 tab(s) orally 2 times a day  Rhopressa 0.02% ophthalmic solution: 1 drop(s) to each affected eye once a day (in the evening)   Flomax 0.4 mg oral capsule: 1 cap(s) orally once a day  latanoprost 0.005% ophthalmic solution: 1 drop(s) to each affected eye once a day (in the evening)  levothyroxine 50 mcg (0.05 mg) oral tablet: 1 tab(s) orally once a day  losartan 25 mg oral tablet: 1 tab(s) orally once a day  lovastatin 20 mg oral tablet: 1 tab(s) orally once a day  metFORMIN 500 mg oral tablet: 1 tab(s) orally 2 times a day  Rhopressa 0.02% ophthalmic solution: 1 drop(s) to each affected eye once a day (in the evening)   Flomax 0.4 mg oral capsule: 1 cap(s) orally once a day  latanoprost 0.005% ophthalmic solution: 1 drop(s) to each affected eye once a day (in the evening)  levothyroxine 50 mcg (0.05 mg) oral tablet: 1 tab(s) orally once a day  lovastatin 20 mg oral tablet: 1 tab(s) orally once a day  metFORMIN 500 mg oral tablet: 1 tab(s) orally 2 times a day  Rhopressa 0.02% ophthalmic solution: 1 drop(s) to each affected eye once a day (in the evening)   Aspirin Enteric Coated 81 mg oral delayed release tablet: 1 tab(s) orally once a day x 30 days for COVID VTE prophylaxis   Flomax 0.4 mg oral capsule: 1 cap(s) orally once a day  latanoprost 0.005% ophthalmic solution: 1 drop(s) to each affected eye once a day (in the evening)  levothyroxine 50 mcg (0.05 mg) oral tablet: 1 tab(s) orally once a day  lovastatin 20 mg oral tablet: 1 tab(s) orally once a day  metFORMIN 500 mg oral tablet: 1 tab(s) orally 2 times a day  Rhopressa 0.02% ophthalmic solution: 1 drop(s) to each affected eye once a day (in the evening)

## 2022-08-02 LAB
ANION GAP SERPL CALC-SCNC: 13 MMOL/L — SIGNIFICANT CHANGE UP (ref 5–17)
BUN SERPL-MCNC: 37.6 MG/DL — HIGH (ref 8–20)
CALCIUM SERPL-MCNC: 8.7 MG/DL — SIGNIFICANT CHANGE UP (ref 8.4–10.5)
CHLORIDE SERPL-SCNC: 106 MMOL/L — SIGNIFICANT CHANGE UP (ref 98–107)
CO2 SERPL-SCNC: 18 MMOL/L — LOW (ref 22–29)
CREAT SERPL-MCNC: 1.2 MG/DL — SIGNIFICANT CHANGE UP (ref 0.5–1.3)
CULTURE RESULTS: SIGNIFICANT CHANGE UP
EGFR: 62 ML/MIN/1.73M2 — SIGNIFICANT CHANGE UP
GLUCOSE BLDC GLUCOMTR-MCNC: 119 MG/DL — HIGH (ref 70–99)
GLUCOSE BLDC GLUCOMTR-MCNC: 130 MG/DL — HIGH (ref 70–99)
GLUCOSE BLDC GLUCOMTR-MCNC: 143 MG/DL — HIGH (ref 70–99)
GLUCOSE BLDC GLUCOMTR-MCNC: 144 MG/DL — HIGH (ref 70–99)
GLUCOSE SERPL-MCNC: 128 MG/DL — HIGH (ref 70–99)
POTASSIUM SERPL-MCNC: 3.4 MMOL/L — LOW (ref 3.5–5.3)
POTASSIUM SERPL-SCNC: 3.4 MMOL/L — LOW (ref 3.5–5.3)
SODIUM SERPL-SCNC: 137 MMOL/L — SIGNIFICANT CHANGE UP (ref 135–145)
SPECIMEN SOURCE: SIGNIFICANT CHANGE UP

## 2022-08-02 PROCEDURE — 99232 SBSQ HOSP IP/OBS MODERATE 35: CPT

## 2022-08-02 RX ORDER — POTASSIUM CHLORIDE 20 MEQ
40 PACKET (EA) ORAL ONCE
Refills: 0 | Status: COMPLETED | OUTPATIENT
Start: 2022-08-02 | End: 2022-08-02

## 2022-08-02 RX ADMIN — POLYETHYLENE GLYCOL 3350 17 GRAM(S): 17 POWDER, FOR SOLUTION ORAL at 12:06

## 2022-08-02 RX ADMIN — CEFTRIAXONE 100 MILLIGRAM(S): 500 INJECTION, POWDER, FOR SOLUTION INTRAMUSCULAR; INTRAVENOUS at 17:09

## 2022-08-02 RX ADMIN — SENNA PLUS 2 TABLET(S): 8.6 TABLET ORAL at 21:30

## 2022-08-02 RX ADMIN — Medication 50 MICROGRAM(S): at 05:54

## 2022-08-02 RX ADMIN — TAMSULOSIN HYDROCHLORIDE 0.8 MILLIGRAM(S): 0.4 CAPSULE ORAL at 21:31

## 2022-08-02 RX ADMIN — ENOXAPARIN SODIUM 40 MILLIGRAM(S): 100 INJECTION SUBCUTANEOUS at 21:31

## 2022-08-02 RX ADMIN — LATANOPROST 1 DROP(S): 0.05 SOLUTION/ DROPS OPHTHALMIC; TOPICAL at 21:31

## 2022-08-02 RX ADMIN — Medication 650 MILLIGRAM(S): at 17:09

## 2022-08-02 RX ADMIN — Medication 650 MILLIGRAM(S): at 05:54

## 2022-08-02 RX ADMIN — Medication 40 MILLIEQUIVALENT(S): at 17:09

## 2022-08-02 RX ADMIN — Medication 3 MILLIGRAM(S): at 21:30

## 2022-08-02 NOTE — PROGRESS NOTE ADULT - SUBJECTIVE AND OBJECTIVE BOX
CC: Follow up     INTERVAL HPI/OVERNIGHT EVENTS:  Patient seen and examined, afebrile. No acute events overnight       Vital Signs Last 24 Hrs  T(C): 36.4 (02 Aug 2022 10:28), Max: 37.2 (02 Aug 2022 05:35)  T(F): 97.6 (02 Aug 2022 10:28), Max: 98.9 (02 Aug 2022 05:35)  HR: 100 (02 Aug 2022 10:) (74 - 100)  BP: 134/78 (02 Aug 2022 10:28) (134/78 - 145/86)  BP(mean): --  RR: 18 (02 Aug 2022 10:) (18 - 18)  SpO2: 99% (02 Aug 2022 10:) (97% - 99%)    Parameters below as of 02 Aug 2022 10:28  Patient On (Oxygen Delivery Method): room air        PHYSICAL EXAM:    GENERAL: NAD,AOX1  HEAD:  Atraumatic, Normocephalic  EYES: conjunctiva and sclera clear  ENMT: Moist mucous membranes  CHEST/LUNG: Clear to auscultation bilaterally; No rales, rhonchi, wheezing, or rubs  HEART: Regular rate and rhythm; No murmurs, rubs, or gallops  ABDOMEN: Soft, Nontender, Nondistended; Bowel sounds present  EXTREMITIES:  2+ Peripheral Pulses, No clubbing, cyanosis, or edema        MEDICATIONS  (STANDING):  cefTRIAXone   IVPB 1000 milliGRAM(s) IV Intermittent every 24 hours  dextrose 5%. 1000 milliLiter(s) (50 mL/Hr) IV Continuous <Continuous>  dextrose 5%. 1000 milliLiter(s) (100 mL/Hr) IV Continuous <Continuous>  dextrose 50% Injectable 25 Gram(s) IV Push once  dextrose 50% Injectable 12.5 Gram(s) IV Push once  dextrose 50% Injectable 25 Gram(s) IV Push once  enoxaparin Injectable 40 milliGRAM(s) SubCutaneous every 24 hours  glucagon  Injectable 1 milliGRAM(s) IntraMuscular once  insulin lispro (ADMELOG) corrective regimen sliding scale   SubCutaneous three times a day before meals  latanoprost 0.005% Ophthalmic Solution 1 Drop(s) Both EYES at bedtime  levothyroxine 50 MICROGram(s) Oral daily  melatonin 3 milliGRAM(s) Oral at bedtime  polyethylene glycol 3350 17 Gram(s) Oral daily  senna 2 Tablet(s) Oral at bedtime  sodium bicarbonate 650 milliGRAM(s) Oral two times a day  tamsulosin 0.8 milliGRAM(s) Oral at bedtime    MEDICATIONS  (PRN):  acetaminophen     Tablet .. 650 milliGRAM(s) Oral every 6 hours PRN Mild Pain (1 - 3), Moderate Pain (4 - 6)  dextrose Oral Gel 15 Gram(s) Oral once PRN Blood Glucose LESS THAN 70 milliGRAM(s)/deciliter      Allergies    No Known Allergies    Intolerances          LABS:                          11.8   6.72  )-----------( 132      ( 01 Aug 2022 05:48 )             33.4     08-    137  |  106  |  37.6<H>  ----------------------------<  128<H>  3.4<L>   |  18.0<L>  |  1.20    Ca    8.7      02 Aug 2022 05:49    TPro  6.1<L>  /  Alb  3.4  /  TBili  0.5  /  DBili  x   /  AST  25  /  ALT  21  /  AlkPhos  61  08-      Urinalysis Basic - ( 2022 19:50 )    Color: Brown / Appearance: Slightly Turbid / S.010 / pH: x  Gluc: x / Ketone: Moderate  / Bili: Negative / Urobili: Negative   Blood: x / Protein: 100 / Nitrite: Positive   Leuk Esterase: Moderate / RBC: >50 /HPF / WBC >50 /HPF   Sq Epi: x / Non Sq Epi: Occasional / Bacteria: TNTC        RADIOLOGY & ADDITIONAL TESTS:

## 2022-08-02 NOTE — PROGRESS NOTE ADULT - ASSESSMENT
The patient is a 77 year old male with a past medical history of hypertension, diabetes mellitus, glaucoma and dementia who was brought to the ER after a witnessed fall at home.   In the ER, CT head, C spine and hip xrays were negative. Admitted to the CDU for MATT placement. Incidentally found to be COVID positive and needed inpatient admission for 5 days prior to be discharged to [facility]. Course complicated by hematuria, urinary retention and SYEDA.    Assessment/Plan:    1. Fall likely due to deconditioning in the setting of a viral infection  - Elevated CPK- IV hydration- improving   - Fall precautions  -CT head and neck negative for acute pathology  -PT recs appreciated; will need MATT    2, Asymptomatic COVID 19 infection   -No respiratory symptoms or hypoxia  -Airborne isolation    3, SYEAD  - Severe right hydronephrosis with hematuria  =Positive UA; Straight cath x 1 for urine culture   - SYEDA resolved with IV hydration   - Seen by urology, PVR <300; hold woodson for now  - Serial bladder scan  - Flomax     4. Acute Metabolic Encephalopathy due to COVID encephalopathy superimposed on dementia  -patient with forgetfulness and suspected dementia per H&P  - AAO x 0   -CT head negative   -UA negative for UTI  -Ammonia WNL    5. Hypertension  -BP stable   -Hold Losartan due to SYEDA      6. TYpe 2 DM   -HBA1C 5.7  - Ademlog SS     7. Glaucoma  -continue latanoprost gtt    8. Hypothyroidism  -TSH WNL   -continue synthroid    9/UTI  - UA positive; day 2 of IV Rocephin  - Follow up urine culture     DVT prophylaxis - Lovenox subcut     Discharge disposition: [facility] after completion of 10 days of isolation

## 2022-08-03 LAB
ANION GAP SERPL CALC-SCNC: 13 MMOL/L — SIGNIFICANT CHANGE UP (ref 5–17)
BUN SERPL-MCNC: 37.8 MG/DL — HIGH (ref 8–20)
CALCIUM SERPL-MCNC: 9 MG/DL — SIGNIFICANT CHANGE UP (ref 8.4–10.5)
CHLORIDE SERPL-SCNC: 108 MMOL/L — HIGH (ref 98–107)
CO2 SERPL-SCNC: 18 MMOL/L — LOW (ref 22–29)
CREAT SERPL-MCNC: 1.16 MG/DL — SIGNIFICANT CHANGE UP (ref 0.5–1.3)
EGFR: 64 ML/MIN/1.73M2 — SIGNIFICANT CHANGE UP
GLUCOSE BLDC GLUCOMTR-MCNC: 103 MG/DL — HIGH (ref 70–99)
GLUCOSE BLDC GLUCOMTR-MCNC: 105 MG/DL — HIGH (ref 70–99)
GLUCOSE BLDC GLUCOMTR-MCNC: 97 MG/DL — SIGNIFICANT CHANGE UP (ref 70–99)
GLUCOSE BLDC GLUCOMTR-MCNC: 99 MG/DL — SIGNIFICANT CHANGE UP (ref 70–99)
GLUCOSE SERPL-MCNC: 122 MG/DL — HIGH (ref 70–99)
POTASSIUM SERPL-MCNC: 4 MMOL/L — SIGNIFICANT CHANGE UP (ref 3.5–5.3)
POTASSIUM SERPL-SCNC: 4 MMOL/L — SIGNIFICANT CHANGE UP (ref 3.5–5.3)
SODIUM SERPL-SCNC: 138 MMOL/L — SIGNIFICANT CHANGE UP (ref 135–145)

## 2022-08-03 PROCEDURE — 99232 SBSQ HOSP IP/OBS MODERATE 35: CPT

## 2022-08-03 RX ADMIN — Medication 650 MILLIGRAM(S): at 05:10

## 2022-08-03 RX ADMIN — Medication 3 MILLIGRAM(S): at 21:25

## 2022-08-03 RX ADMIN — POLYETHYLENE GLYCOL 3350 17 GRAM(S): 17 POWDER, FOR SOLUTION ORAL at 13:29

## 2022-08-03 RX ADMIN — Medication 650 MILLIGRAM(S): at 18:03

## 2022-08-03 RX ADMIN — TAMSULOSIN HYDROCHLORIDE 0.8 MILLIGRAM(S): 0.4 CAPSULE ORAL at 21:25

## 2022-08-03 RX ADMIN — SENNA PLUS 2 TABLET(S): 8.6 TABLET ORAL at 21:25

## 2022-08-03 RX ADMIN — ENOXAPARIN SODIUM 40 MILLIGRAM(S): 100 INJECTION SUBCUTANEOUS at 21:26

## 2022-08-03 RX ADMIN — Medication 50 MICROGRAM(S): at 05:10

## 2022-08-03 RX ADMIN — CEFTRIAXONE 100 MILLIGRAM(S): 500 INJECTION, POWDER, FOR SOLUTION INTRAMUSCULAR; INTRAVENOUS at 18:03

## 2022-08-03 RX ADMIN — LATANOPROST 1 DROP(S): 0.05 SOLUTION/ DROPS OPHTHALMIC; TOPICAL at 21:26

## 2022-08-03 NOTE — PROGRESS NOTE ADULT - ASSESSMENT
The patient is a 77 year old male with a past medical history of hypertension, diabetes mellitus, glaucoma and dementia who was brought to the ER after a witnessed fall at home.   In the ER, CT head, C spine and hip xrays were negative. Admitted to the CDU for MATT placement. Incidentally found to be COVID positive and needed inpatient admission for 5 days prior to be discharged to [facility]. Course complicated by hematuria, urinary retention and SYEDA.    Assessment/Plan:    1. Fall likely due to deconditioning in the setting of a viral infection  - Elevated CPK- IV hydration- improving   - Fall precautions  -CT head and neck negative for acute pathology  -PT recs appreciated; will need MATT    2, Asymptomatic COVID 19 infection   -No respiratory symptoms or hypoxia  -Airborne isolation    3, SYEDA  - Resolved  - Severe right hydronephrosis with hematuria  - SYEDA resolved with IV hydration   - Serial bladder scan  - Flomax     4 Pyuria  - Urine culture negative  - IV Rocephin x 3 days     54. Acute Metabolic Encephalopathy due to COVID encephalopathy superimposed on dementia  -patient with forgetfulness and suspected dementia per H&P  - AAO x 0   -CT head negative   -UA negative for UTI  -Ammonia WNL    6. Hypertension  -BP stable   -Hold Losartan due to SYEDA      7. TYpe 2 DM   -HBA1C 5.7  - Ademlog SS     8. Glaucoma  -continue latanoprost gtt    9. Hypothyroidism  -TSH WNL   -continue synthroid    DVT prophylaxis - Lovenox subcut     Discharge disposition: [facility] after completion of 10 days of isolation

## 2022-08-03 NOTE — PROGRESS NOTE ADULT - SUBJECTIVE AND OBJECTIVE BOX
CC: Follow up     INTERVAL HPI/OVERNIGHT EVENTS: Patient seen and examined, afebrile. No oxygen.       Vital Signs Last 24 Hrs  T(C): 36.6 (03 Aug 2022 12:11), Max: 37.4 (02 Aug 2022 20:00)  T(F): 97.8 (03 Aug 2022 12:11), Max: 99.4 (02 Aug 2022 20:00)  HR: 95 (03 Aug 2022 12:11) (94 - 100)  BP: 144/85 (03 Aug 2022 12:11) (144/85 - 170/69)  BP(mean): --  RR: 18 (03 Aug 2022 12:11) (18 - 18)  SpO2: 98% (03 Aug 2022 12:11) (96% - 98%)    Parameters below as of 03 Aug 2022 12:11  Patient On (Oxygen Delivery Method): room air        PHYSICAL EXAM:    GENERAL: NAD, AOX1  HEAD:  Atraumatic, Normocephalic  EYES:  conjunctiva and sclera clear  NECK: Supple  CHEST/LUNG: Clear to auscultation bilaterally; No rales, rhonchi, wheezing, or rubs  HEART: Regular rate and rhythm; No murmurs, rubs, or gallops  ABDOMEN: Soft, Nontender, Nondistended; Bowel sounds present  EXTREMITIES:  2+ Peripheral Pulses, No clubbing, cyanosis, or edema        MEDICATIONS  (STANDING):  cefTRIAXone   IVPB 1000 milliGRAM(s) IV Intermittent every 24 hours  dextrose 5%. 1000 milliLiter(s) (50 mL/Hr) IV Continuous <Continuous>  dextrose 5%. 1000 milliLiter(s) (100 mL/Hr) IV Continuous <Continuous>  dextrose 50% Injectable 25 Gram(s) IV Push once  dextrose 50% Injectable 12.5 Gram(s) IV Push once  dextrose 50% Injectable 25 Gram(s) IV Push once  enoxaparin Injectable 40 milliGRAM(s) SubCutaneous every 24 hours  glucagon  Injectable 1 milliGRAM(s) IntraMuscular once  insulin lispro (ADMELOG) corrective regimen sliding scale   SubCutaneous three times a day before meals  latanoprost 0.005% Ophthalmic Solution 1 Drop(s) Both EYES at bedtime  levothyroxine 50 MICROGram(s) Oral daily  melatonin 3 milliGRAM(s) Oral at bedtime  polyethylene glycol 3350 17 Gram(s) Oral daily  senna 2 Tablet(s) Oral at bedtime  sodium bicarbonate 650 milliGRAM(s) Oral two times a day  tamsulosin 0.8 milliGRAM(s) Oral at bedtime    MEDICATIONS  (PRN):  acetaminophen     Tablet .. 650 milliGRAM(s) Oral every 6 hours PRN Mild Pain (1 - 3), Moderate Pain (4 - 6)  dextrose Oral Gel 15 Gram(s) Oral once PRN Blood Glucose LESS THAN 70 milliGRAM(s)/deciliter      Allergies    No Known Allergies    Intolerances          LABS:      08-03    138  |  108<H>  |  37.8<H>  ----------------------------<  122<H>  4.0   |  18.0<L>  |  1.16    Ca    9.0      03 Aug 2022 06:44            RADIOLOGY & ADDITIONAL TESTS:

## 2022-08-04 LAB
ANION GAP SERPL CALC-SCNC: 14 MMOL/L — SIGNIFICANT CHANGE UP (ref 5–17)
BUN SERPL-MCNC: 41.2 MG/DL — HIGH (ref 8–20)
CALCIUM SERPL-MCNC: 9.1 MG/DL — SIGNIFICANT CHANGE UP (ref 8.4–10.5)
CHLORIDE SERPL-SCNC: 106 MMOL/L — SIGNIFICANT CHANGE UP (ref 98–107)
CO2 SERPL-SCNC: 19 MMOL/L — LOW (ref 22–29)
CREAT SERPL-MCNC: 1.17 MG/DL — SIGNIFICANT CHANGE UP (ref 0.5–1.3)
EGFR: 64 ML/MIN/1.73M2 — SIGNIFICANT CHANGE UP
GLUCOSE BLDC GLUCOMTR-MCNC: 100 MG/DL — HIGH (ref 70–99)
GLUCOSE BLDC GLUCOMTR-MCNC: 102 MG/DL — HIGH (ref 70–99)
GLUCOSE BLDC GLUCOMTR-MCNC: 112 MG/DL — HIGH (ref 70–99)
GLUCOSE BLDC GLUCOMTR-MCNC: 88 MG/DL — SIGNIFICANT CHANGE UP (ref 70–99)
GLUCOSE SERPL-MCNC: 96 MG/DL — SIGNIFICANT CHANGE UP (ref 70–99)
HCT VFR BLD CALC: 35.1 % — LOW (ref 39–50)
HGB BLD-MCNC: 12 G/DL — LOW (ref 13–17)
MCHC RBC-ENTMCNC: 31.3 PG — SIGNIFICANT CHANGE UP (ref 27–34)
MCHC RBC-ENTMCNC: 34.2 GM/DL — SIGNIFICANT CHANGE UP (ref 32–36)
MCV RBC AUTO: 91.4 FL — SIGNIFICANT CHANGE UP (ref 80–100)
PLATELET # BLD AUTO: 186 K/UL — SIGNIFICANT CHANGE UP (ref 150–400)
POTASSIUM SERPL-MCNC: 4.6 MMOL/L — SIGNIFICANT CHANGE UP (ref 3.5–5.3)
POTASSIUM SERPL-SCNC: 4.6 MMOL/L — SIGNIFICANT CHANGE UP (ref 3.5–5.3)
RBC # BLD: 3.84 M/UL — LOW (ref 4.2–5.8)
RBC # FLD: 13.7 % — SIGNIFICANT CHANGE UP (ref 10.3–14.5)
SARS-COV-2 RNA SPEC QL NAA+PROBE: DETECTED
SODIUM SERPL-SCNC: 139 MMOL/L — SIGNIFICANT CHANGE UP (ref 135–145)
WBC # BLD: 8.01 K/UL — SIGNIFICANT CHANGE UP (ref 3.8–10.5)
WBC # FLD AUTO: 8.01 K/UL — SIGNIFICANT CHANGE UP (ref 3.8–10.5)

## 2022-08-04 PROCEDURE — 99232 SBSQ HOSP IP/OBS MODERATE 35: CPT

## 2022-08-04 RX ADMIN — TAMSULOSIN HYDROCHLORIDE 0.8 MILLIGRAM(S): 0.4 CAPSULE ORAL at 21:36

## 2022-08-04 RX ADMIN — SENNA PLUS 2 TABLET(S): 8.6 TABLET ORAL at 21:37

## 2022-08-04 RX ADMIN — LATANOPROST 1 DROP(S): 0.05 SOLUTION/ DROPS OPHTHALMIC; TOPICAL at 21:45

## 2022-08-04 RX ADMIN — POLYETHYLENE GLYCOL 3350 17 GRAM(S): 17 POWDER, FOR SOLUTION ORAL at 13:08

## 2022-08-04 RX ADMIN — Medication 650 MILLIGRAM(S): at 22:37

## 2022-08-04 RX ADMIN — Medication 650 MILLIGRAM(S): at 21:37

## 2022-08-04 RX ADMIN — Medication 3 MILLIGRAM(S): at 21:37

## 2022-08-04 RX ADMIN — Medication 650 MILLIGRAM(S): at 17:54

## 2022-08-04 RX ADMIN — Medication 650 MILLIGRAM(S): at 05:16

## 2022-08-04 RX ADMIN — Medication 650 MILLIGRAM(S): at 01:39

## 2022-08-04 RX ADMIN — ENOXAPARIN SODIUM 40 MILLIGRAM(S): 100 INJECTION SUBCUTANEOUS at 21:36

## 2022-08-04 RX ADMIN — Medication 650 MILLIGRAM(S): at 00:39

## 2022-08-04 RX ADMIN — Medication 50 MICROGRAM(S): at 05:17

## 2022-08-04 NOTE — PROGRESS NOTE ADULT - SUBJECTIVE AND OBJECTIVE BOX
CC: Follow up     INTERVAL HPI/OVERNIGHT EVENTS: Patient seen and examined no acute events overnight. Sitting up in bed, no hypoxia       Vital Signs Last 24 Hrs  T(C): 36.5 (04 Aug 2022 09:16), Max: 36.9 (04 Aug 2022 05:42)  T(F): 97.7 (04 Aug 2022 09:16), Max: 98.5 (04 Aug 2022 05:42)  HR: 95 (04 Aug 2022 09:16) (94 - 100)  BP: 128/87 (04 Aug 2022 09:16) (128/87 - 166/83)  BP(mean): --  RR: 18 (04 Aug 2022 09:16) (18 - 18)  SpO2: 99% (04 Aug 2022 09:16) (98% - 99%)    Parameters below as of 04 Aug 2022 09:16  Patient On (Oxygen Delivery Method): room air        PHYSICAL EXAM:    GENERAL: NAD AOX1  HEAD:  Atraumatic, Normocephalic  EYES: conjunctiva and sclera clear  NECK: Supple  CHEST/LUNG: Clear to auscultation bilaterally; No rales, rhonchi, wheezing, or rubs  HEART: Regular rate and rhythm; No murmurs, rubs, or gallops  ABDOMEN: Soft, Nontender, Nondistended; Bowel sounds present  EXTREMITIES:  2+ Peripheral Pulses, No clubbing, cyanosis, or edema        MEDICATIONS  (STANDING):  dextrose 5%. 1000 milliLiter(s) (50 mL/Hr) IV Continuous <Continuous>  dextrose 5%. 1000 milliLiter(s) (100 mL/Hr) IV Continuous <Continuous>  dextrose 50% Injectable 25 Gram(s) IV Push once  dextrose 50% Injectable 12.5 Gram(s) IV Push once  dextrose 50% Injectable 25 Gram(s) IV Push once  enoxaparin Injectable 40 milliGRAM(s) SubCutaneous every 24 hours  glucagon  Injectable 1 milliGRAM(s) IntraMuscular once  insulin lispro (ADMELOG) corrective regimen sliding scale   SubCutaneous three times a day before meals  latanoprost 0.005% Ophthalmic Solution 1 Drop(s) Both EYES at bedtime  levothyroxine 50 MICROGram(s) Oral daily  melatonin 3 milliGRAM(s) Oral at bedtime  polyethylene glycol 3350 17 Gram(s) Oral daily  senna 2 Tablet(s) Oral at bedtime  sodium bicarbonate 650 milliGRAM(s) Oral two times a day  tamsulosin 0.8 milliGRAM(s) Oral at bedtime    MEDICATIONS  (PRN):  acetaminophen     Tablet .. 650 milliGRAM(s) Oral every 6 hours PRN Mild Pain (1 - 3), Moderate Pain (4 - 6)  dextrose Oral Gel 15 Gram(s) Oral once PRN Blood Glucose LESS THAN 70 milliGRAM(s)/deciliter      Allergies    No Known Allergies    Intolerances          LABS:                          12.0   8.01  )-----------( 186      ( 04 Aug 2022 06:50 )             35.1     08-04    139  |  106  |  41.2<H>  ----------------------------<  96  4.6   |  19.0<L>  |  1.17    Ca    9.1      04 Aug 2022 06:50            RADIOLOGY & ADDITIONAL TESTS:

## 2022-08-04 NOTE — PROGRESS NOTE ADULT - ASSESSMENT
The patient is a 77 year old male with a past medical history of hypertension, diabetes mellitus, glaucoma and dementia who was brought to the ER after a witnessed fall at home.   In the ER, CT head, C spine and hip xrays were negative. Admitted to the CDU for MATT placement. Incidentally found to be COVID positive and needed inpatient admission for 5 days prior to be discharged to [facility]. Course complicated by hematuria, urinary retention and SYEDA.    Assessment/Plan:    1. Fall likely due to deconditioning in the setting of a viral infection  - Elevated CPK- IV hydration- improved  - Fall precautions  -CT head and neck negative for acute pathology  -PT recs appreciated; will need MATT    2, Asymptomatic COVID 19 infection   -No respiratory symptoms or hypoxia  -Airborne isolation    3, SYEDA  - Resolved  - Severe right hydronephrosis with hematuria  - SYEDA resolved with IV hydration   - Serial bladder scan  - Flomax     4 Pyuria  - Urine culture negative  - IV Rocephin x 3 days     54. Acute Metabolic Encephalopathy due to COVID encephalopathy superimposed on dementia  -patient with forgetfulness and suspected dementia per H&P  - AAO x 0   -CT head negative   -UA negative for UTI  -Ammonia WNL    6. Hypertension  -BP stable   -Hold Losartan due to SYEDA      7. TYpe 2 DM   -HBA1C 5.7  - Ademlog SS     8. Glaucoma  -continue latanoprost gtt    9. Hypothyroidism  -TSH WNL   -continue synthroid    DVT prophylaxis - Lovenox subcut     Discharge disposition: [facility] after completion of 10 days of isolation

## 2022-08-04 NOTE — PROGRESS NOTE ADULT - PROVIDER SPECIALTY LIST ADULT
Hospitalist
Urology
Hospitalist

## 2022-08-05 ENCOUNTER — TRANSCRIPTION ENCOUNTER (OUTPATIENT)
Age: 77
End: 2022-08-05

## 2022-08-05 VITALS
RESPIRATION RATE: 18 BRPM | OXYGEN SATURATION: 99 % | TEMPERATURE: 98 F | HEART RATE: 100 BPM | DIASTOLIC BLOOD PRESSURE: 82 MMHG | SYSTOLIC BLOOD PRESSURE: 158 MMHG

## 2022-08-05 LAB
GLUCOSE BLDC GLUCOMTR-MCNC: 107 MG/DL — HIGH (ref 70–99)
GLUCOSE BLDC GLUCOMTR-MCNC: 119 MG/DL — HIGH (ref 70–99)

## 2022-08-05 PROCEDURE — G1004: CPT

## 2022-08-05 PROCEDURE — 36415 COLL VENOUS BLD VENIPUNCTURE: CPT

## 2022-08-05 PROCEDURE — 82962 GLUCOSE BLOOD TEST: CPT

## 2022-08-05 PROCEDURE — 0225U NFCT DS DNA&RNA 21 SARSCOV2: CPT

## 2022-08-05 PROCEDURE — G0378: CPT

## 2022-08-05 PROCEDURE — U0003: CPT

## 2022-08-05 PROCEDURE — 80053 COMPREHEN METABOLIC PANEL: CPT

## 2022-08-05 PROCEDURE — 87086 URINE CULTURE/COLONY COUNT: CPT

## 2022-08-05 PROCEDURE — 82553 CREATINE MB FRACTION: CPT

## 2022-08-05 PROCEDURE — 87507 IADNA-DNA/RNA PROBE TQ 12-25: CPT

## 2022-08-05 PROCEDURE — 74176 CT ABD & PELVIS W/O CONTRAST: CPT

## 2022-08-05 PROCEDURE — 72125 CT NECK SPINE W/O DYE: CPT | Mod: MG

## 2022-08-05 PROCEDURE — 73522 X-RAY EXAM HIPS BI 3-4 VIEWS: CPT

## 2022-08-05 PROCEDURE — 84100 ASSAY OF PHOSPHORUS: CPT

## 2022-08-05 PROCEDURE — 82140 ASSAY OF AMMONIA: CPT

## 2022-08-05 PROCEDURE — 84443 ASSAY THYROID STIM HORMONE: CPT

## 2022-08-05 PROCEDURE — U0005: CPT

## 2022-08-05 PROCEDURE — 99239 HOSP IP/OBS DSCHRG MGMT >30: CPT

## 2022-08-05 PROCEDURE — 85025 COMPLETE CBC W/AUTO DIFF WBC: CPT

## 2022-08-05 PROCEDURE — 81001 URINALYSIS AUTO W/SCOPE: CPT

## 2022-08-05 PROCEDURE — 85027 COMPLETE CBC AUTOMATED: CPT

## 2022-08-05 PROCEDURE — 83036 HEMOGLOBIN GLYCOSYLATED A1C: CPT

## 2022-08-05 PROCEDURE — 76770 US EXAM ABDO BACK WALL COMP: CPT

## 2022-08-05 PROCEDURE — 83735 ASSAY OF MAGNESIUM: CPT

## 2022-08-05 PROCEDURE — 70450 CT HEAD/BRAIN W/O DYE: CPT | Mod: MG

## 2022-08-05 PROCEDURE — 82607 VITAMIN B-12: CPT

## 2022-08-05 PROCEDURE — 82550 ASSAY OF CK (CPK): CPT

## 2022-08-05 PROCEDURE — 80048 BASIC METABOLIC PNL TOTAL CA: CPT

## 2022-08-05 PROCEDURE — 99285 EMERGENCY DEPT VISIT HI MDM: CPT | Mod: 25

## 2022-08-05 RX ORDER — LOSARTAN POTASSIUM 100 MG/1
1 TABLET, FILM COATED ORAL
Qty: 0 | Refills: 0 | DISCHARGE

## 2022-08-05 RX ADMIN — Medication 50 MICROGRAM(S): at 05:21

## 2022-08-05 RX ADMIN — Medication 650 MILLIGRAM(S): at 05:19

## 2022-08-05 NOTE — DISCHARGE NOTE NURSING/CASE MANAGEMENT/SOCIAL WORK - PATIENT PORTAL LINK FT
You can access the FollowMyHealth Patient Portal offered by [facility] by registering at the following website: [email or web address]. By joining [facility]’s FollowMyHealth portal, you will also be able to view your health information using other applications (apps) compatible with our system.

## 2022-08-05 NOTE — DISCHARGE NOTE NURSING/CASE MANAGEMENT/SOCIAL WORK - NSDCPEFALRISK_GEN_ALL_CORE
For information on Fall & Injury Prevention, visit: [email or web address] OR  [email or web address] OR  https://www.cdc.gov/steadi/patient.html

## 2022-08-09 PROBLEM — H40.9 UNSPECIFIED GLAUCOMA: Chronic | Status: ACTIVE | Noted: 2022-07-27

## 2022-08-09 PROBLEM — E11.9 TYPE 2 DIABETES MELLITUS WITHOUT COMPLICATIONS: Chronic | Status: ACTIVE | Noted: 2022-07-27

## 2022-08-09 PROBLEM — I10 ESSENTIAL (PRIMARY) HYPERTENSION: Chronic | Status: ACTIVE | Noted: 2022-07-27

## 2022-08-09 PROBLEM — E03.9 HYPOTHYROIDISM, UNSPECIFIED: Chronic | Status: ACTIVE | Noted: 2022-07-27

## 2022-08-12 ENCOUNTER — APPOINTMENT (OUTPATIENT)
Dept: UROLOGY | Facility: CLINIC | Age: 77
End: 2022-08-12

## 2022-08-12 VITALS — SYSTOLIC BLOOD PRESSURE: 142 MMHG | OXYGEN SATURATION: 99 % | DIASTOLIC BLOOD PRESSURE: 85 MMHG | TEMPERATURE: 97.7 F

## 2022-08-12 PROCEDURE — 99212 OFFICE O/P EST SF 10 MIN: CPT

## 2022-08-19 ENCOUNTER — APPOINTMENT (OUTPATIENT)
Dept: UROLOGY | Facility: CLINIC | Age: 77
End: 2022-08-19

## 2022-08-20 NOTE — HISTORY OF PRESENT ILLNESS
[FreeTextEntry1] : Recent hospitalization after a fall. Found to have right UPJO on non contrast CT, ? UTI [Hematuria - Gross] : gross hematuria [Fever] : no fever [None] : None

## 2022-08-23 ENCOUNTER — APPOINTMENT (OUTPATIENT)
Dept: UROLOGY | Facility: CLINIC | Age: 77
End: 2022-08-23

## 2022-09-13 ENCOUNTER — APPOINTMENT (OUTPATIENT)
Dept: UROLOGY | Facility: CLINIC | Age: 77
End: 2022-09-13

## 2022-09-13 PROCEDURE — 51702 INSERT TEMP BLADDER CATH: CPT

## 2022-09-13 PROCEDURE — 51798 US URINE CAPACITY MEASURE: CPT

## 2022-09-13 RX ORDER — NETARSUDIL 0.2 MG/ML
0.02 SOLUTION/ DROPS OPHTHALMIC; TOPICAL DAILY
Refills: 0 | Status: ACTIVE | COMMUNITY

## 2022-09-13 RX ORDER — LEVOTHYROXINE SODIUM 50 UG/1
50 CAPSULE ORAL DAILY
Refills: 0 | Status: ACTIVE | COMMUNITY

## 2022-09-13 RX ORDER — SIMVASTATIN 20 MG/1
20 TABLET, FILM COATED ORAL
Refills: 0 | Status: ACTIVE | COMMUNITY

## 2022-09-13 RX ORDER — LATANOPROST/PF 0.005 %
0.01 DROPS OPHTHALMIC (EYE) DAILY
Refills: 0 | Status: ACTIVE | COMMUNITY

## 2022-09-13 RX ORDER — METFORMIN HYDROCHLORIDE 500 MG/1
500 TABLET, COATED ORAL TWICE DAILY
Refills: 0 | Status: ACTIVE | COMMUNITY

## 2022-09-13 RX ORDER — TAMSULOSIN HCL 0.4 MG
0.4 CAPSULE ORAL AT BEDTIME
Refills: 0 | Status: ACTIVE | COMMUNITY

## 2022-09-13 RX ORDER — FERROUS SULFATE 325(65) MG
325 (65 FE) TABLET ORAL TWICE DAILY
Refills: 0 | Status: ACTIVE | COMMUNITY

## 2022-09-13 RX ORDER — BISACODYL 10 MG/1
SUPPOSITORY RECTAL DAILY
Refills: 0 | Status: ACTIVE | COMMUNITY

## 2022-09-13 RX ORDER — MAGNESIUM HYDROXIDE 400 MG/5ML
400 SUSPENSION ORAL DAILY
Refills: 0 | Status: ACTIVE | COMMUNITY

## 2022-09-21 LAB — BACTERIA UR CULT: ABNORMAL

## 2022-09-27 ENCOUNTER — APPOINTMENT (OUTPATIENT)
Dept: UROLOGY | Facility: CLINIC | Age: 77
End: 2022-09-27

## 2022-10-14 ENCOUNTER — EMERGENCY (EMERGENCY)
Facility: HOSPITAL | Age: 77
LOS: 1 days | Discharge: HOME | End: 2022-10-14
Attending: STUDENT IN AN ORGANIZED HEALTH CARE EDUCATION/TRAINING PROGRAM
Payer: MEDICARE

## 2022-10-14 VITALS
HEART RATE: 89 BPM | DIASTOLIC BLOOD PRESSURE: 64 MMHG | OXYGEN SATURATION: 99 % | TEMPERATURE: 98 F | HEIGHT: 68 IN | SYSTOLIC BLOOD PRESSURE: 128 MMHG | WEIGHT: 145.06 LBS | RESPIRATION RATE: 19 BRPM

## 2022-10-14 VITALS
OXYGEN SATURATION: 99 % | RESPIRATION RATE: 18 BRPM | TEMPERATURE: 98 F | SYSTOLIC BLOOD PRESSURE: 120 MMHG | HEART RATE: 82 BPM | DIASTOLIC BLOOD PRESSURE: 60 MMHG

## 2022-10-14 PROCEDURE — 72125 CT NECK SPINE W/O DYE: CPT | Mod: 26,MG

## 2022-10-14 PROCEDURE — 99284 EMERGENCY DEPT VISIT MOD MDM: CPT

## 2022-10-14 PROCEDURE — G1004: CPT

## 2022-10-14 PROCEDURE — 70450 CT HEAD/BRAIN W/O DYE: CPT | Mod: 26,MG

## 2022-10-14 NOTE — ED PROVIDER NOTE - PROGRESS NOTE DETAILS
Gonzalo: Pt received in signout from Dr. Restrepo. CTs reviewed. Will arrange for ambulance back to facility. Spoke to staff at [facility], who were made aware of plan.

## 2022-10-14 NOTE — ED PROVIDER NOTE - PHYSICAL EXAMINATION
Constitutional - well-developed.   Head - occipital scalp hematoma. Airway patent.   Eyes - PERRL.   CV - RRR. no murmur. no edema.   Pulm - CTAB.   Abd - soft, nt. no rebound. no guarding.   Neuro - A&Ox1. strength 5/5 x4. sensation intact x4. normal gait.   Skin - No rash. .  MSK - normal ROM.

## 2022-10-14 NOTE — ED PROVIDER NOTE - CLINICAL SUMMARY MEDICAL DECISION MAKING FREE TEXT BOX
Pt with a head injury from witnessed fall.  will check CT and if neg d/c back to [facility]. Pt with a head injury from witnessed fall.  will check CT and if neg d/c back to nh.    Pt signed out to dr. bai pending CT.

## 2022-10-14 NOTE — ED PROVIDER NOTE - OBJECTIVE STATEMENT
Pt is a 76 yo M co fall.  PMHx significant for dementia, hypothyroidism. Pt has a witnessed fall at [facility] and hit the back of his head. Pt admits to falling but is very poor historian. Pt with no other complaints.

## 2022-10-14 NOTE — ED PROVIDER NOTE - PATIENT PORTAL LINK FT
You can access the FollowMyHealth Patient Portal offered by [facility] by registering at the following website: [email or web address]. By joining [employer]’s FollowMyHealth portal, you will also be able to view your health information using other applications (apps) compatible with our system.

## 2022-10-14 NOTE — ED ADULT TRIAGE NOTE - CHIEF COMPLAINT QUOTE
Pt BIBA from [facility] s/p witnessed fall.  As per EMS, pt fell backward. + head strike.  Negative LOC/blood thinners. Pt arrives A&Ox1-2; as per paperwork, pt has baseline disorientation.  Contusion to posterior head noted.  Pt denies any complaints

## 2022-10-14 NOTE — ED ADULT NURSE NOTE - OBJECTIVE STATEMENT
Patient BIBA due to witnessed fall.  Pt a&ox1-2, poor historian.  States he fell backwards hitting his head while getting out of a vehicle.  Small bump noted to posterior head.  No bleeding noted.  -LOC, -blood thinners.  NAD noted, respirations even and unlabored.  Safety precautions in place.  Plan of care explained, pt verbalized understanding.

## 2022-10-15 PROCEDURE — 72125 CT NECK SPINE W/O DYE: CPT | Mod: MG

## 2022-10-15 PROCEDURE — G1004: CPT

## 2022-10-15 PROCEDURE — 70450 CT HEAD/BRAIN W/O DYE: CPT | Mod: MG

## 2022-10-15 PROCEDURE — 82962 GLUCOSE BLOOD TEST: CPT

## 2022-10-15 PROCEDURE — 99284 EMERGENCY DEPT VISIT MOD MDM: CPT | Mod: 25

## 2022-12-01 ENCOUNTER — INPATIENT (INPATIENT)
Facility: HOSPITAL | Age: 77
LOS: 7 days | Discharge: HOME | DRG: 728 | End: 2022-12-08
Attending: INTERNAL MEDICINE | Admitting: INTERNAL MEDICINE
Payer: MEDICARE

## 2022-12-01 VITALS
HEIGHT: 68 IN | OXYGEN SATURATION: 99 % | HEART RATE: 85 BPM | DIASTOLIC BLOOD PRESSURE: 69 MMHG | RESPIRATION RATE: 18 BRPM | WEIGHT: 139.99 LBS | SYSTOLIC BLOOD PRESSURE: 125 MMHG | TEMPERATURE: 99 F

## 2022-12-01 DIAGNOSIS — R33.9 RETENTION OF URINE, UNSPECIFIED: ICD-10-CM
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
## 2022-12-01 DIAGNOSIS — N40.1 BENIGN PROSTATIC HYPERPLASIA WITH LOWER URINARY TRACT SYMPTOMS: ICD-10-CM

## 2022-12-01 DIAGNOSIS — N48.89 OTHER SPECIFIED DISORDERS OF PENIS: ICD-10-CM

## 2022-12-01 DIAGNOSIS — N13.30 UNSPECIFIED HYDRONEPHROSIS: ICD-10-CM
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
## 2022-12-01 DIAGNOSIS — Z97.8 PRESENCE OF OTHER SPECIFIED DEVICES: ICD-10-CM

## 2022-12-01 LAB
A1C WITH ESTIMATED AVERAGE GLUCOSE RESULT: 6.1 % — HIGH (ref 4–5.6)
ALBUMIN SERPL ELPH-MCNC: 2.6 G/DL — LOW (ref 3.3–5)
ALBUMIN SERPL ELPH-MCNC: 2.8 G/DL — LOW (ref 3.3–5)
ALP SERPL-CCNC: 70 U/L — SIGNIFICANT CHANGE UP (ref 40–120)
ALP SERPL-CCNC: 72 U/L — SIGNIFICANT CHANGE UP (ref 40–120)
ALT FLD-CCNC: 18 U/L — SIGNIFICANT CHANGE UP (ref 12–78)
ALT FLD-CCNC: 19 U/L — SIGNIFICANT CHANGE UP (ref 12–78)
ANION GAP SERPL CALC-SCNC: 4 MMOL/L — LOW (ref 5–17)
ANION GAP SERPL CALC-SCNC: 5 MMOL/L — SIGNIFICANT CHANGE UP (ref 5–17)
APPEARANCE UR: CLEAR — SIGNIFICANT CHANGE UP
AST SERPL-CCNC: 11 U/L — LOW (ref 15–37)
AST SERPL-CCNC: 9 U/L — LOW (ref 15–37)
BASOPHILS # BLD AUTO: 0.01 K/UL — SIGNIFICANT CHANGE UP (ref 0–0.2)
BASOPHILS # BLD AUTO: 0.02 K/UL — SIGNIFICANT CHANGE UP (ref 0–0.2)
BASOPHILS NFR BLD AUTO: 0.1 % — SIGNIFICANT CHANGE UP (ref 0–2)
BASOPHILS NFR BLD AUTO: 0.3 % — SIGNIFICANT CHANGE UP (ref 0–2)
BILIRUB SERPL-MCNC: 0.2 MG/DL — SIGNIFICANT CHANGE UP (ref 0.2–1.2)
BILIRUB SERPL-MCNC: 0.3 MG/DL — SIGNIFICANT CHANGE UP (ref 0.2–1.2)
BILIRUB UR-MCNC: NEGATIVE — SIGNIFICANT CHANGE UP
BUN SERPL-MCNC: 30 MG/DL — HIGH (ref 7–23)
BUN SERPL-MCNC: 30 MG/DL — HIGH (ref 7–23)
CALCIUM SERPL-MCNC: 8.5 MG/DL — SIGNIFICANT CHANGE UP (ref 8.5–10.1)
CALCIUM SERPL-MCNC: 9.4 MG/DL — SIGNIFICANT CHANGE UP (ref 8.5–10.1)
CHLORIDE SERPL-SCNC: 104 MMOL/L — SIGNIFICANT CHANGE UP (ref 96–108)
CHLORIDE SERPL-SCNC: 106 MMOL/L — SIGNIFICANT CHANGE UP (ref 96–108)
CO2 SERPL-SCNC: 26 MMOL/L — SIGNIFICANT CHANGE UP (ref 22–31)
CO2 SERPL-SCNC: 28 MMOL/L — SIGNIFICANT CHANGE UP (ref 22–31)
COLOR SPEC: YELLOW — SIGNIFICANT CHANGE UP
CREAT SERPL-MCNC: 0.9 MG/DL — SIGNIFICANT CHANGE UP (ref 0.5–1.3)
CREAT SERPL-MCNC: 0.95 MG/DL — SIGNIFICANT CHANGE UP (ref 0.5–1.3)
DIFF PNL FLD: ABNORMAL
EGFR: 82 ML/MIN/1.73M2 — SIGNIFICANT CHANGE UP
EGFR: 87 ML/MIN/1.73M2 — SIGNIFICANT CHANGE UP
EOSINOPHIL # BLD AUTO: 0.28 K/UL — SIGNIFICANT CHANGE UP (ref 0–0.5)
EOSINOPHIL # BLD AUTO: 0.29 K/UL — SIGNIFICANT CHANGE UP (ref 0–0.5)
EOSINOPHIL NFR BLD AUTO: 3.5 % — SIGNIFICANT CHANGE UP (ref 0–6)
EOSINOPHIL NFR BLD AUTO: 3.8 % — SIGNIFICANT CHANGE UP (ref 0–6)
ESTIMATED AVERAGE GLUCOSE: 128 MG/DL — HIGH (ref 68–114)
FLUAV AG NPH QL: SIGNIFICANT CHANGE UP
FLUBV AG NPH QL: SIGNIFICANT CHANGE UP
GLUCOSE SERPL-MCNC: 101 MG/DL — HIGH (ref 70–99)
GLUCOSE SERPL-MCNC: 112 MG/DL — HIGH (ref 70–99)
GLUCOSE UR QL: NEGATIVE — SIGNIFICANT CHANGE UP
HCT VFR BLD CALC: 27.7 % — LOW (ref 39–50)
HCT VFR BLD CALC: 28.2 % — LOW (ref 39–50)
HGB BLD-MCNC: 9.1 G/DL — LOW (ref 13–17)
HGB BLD-MCNC: 9.4 G/DL — LOW (ref 13–17)
IMM GRANULOCYTES NFR BLD AUTO: 0.2 % — SIGNIFICANT CHANGE UP (ref 0–0.9)
IMM GRANULOCYTES NFR BLD AUTO: 0.4 % — SIGNIFICANT CHANGE UP (ref 0–0.9)
KETONES UR-MCNC: NEGATIVE — SIGNIFICANT CHANGE UP
LEUKOCYTE ESTERASE UR-ACNC: ABNORMAL
LIDOCAIN IGE QN: 196 U/L — SIGNIFICANT CHANGE UP (ref 73–393)
LYMPHOCYTES # BLD AUTO: 0.92 K/UL — LOW (ref 1–3.3)
LYMPHOCYTES # BLD AUTO: 0.94 K/UL — LOW (ref 1–3.3)
LYMPHOCYTES # BLD AUTO: 11.5 % — LOW (ref 13–44)
LYMPHOCYTES # BLD AUTO: 12.6 % — LOW (ref 13–44)
MCHC RBC-ENTMCNC: 31.3 PG — SIGNIFICANT CHANGE UP (ref 27–34)
MCHC RBC-ENTMCNC: 31.6 PG — SIGNIFICANT CHANGE UP (ref 27–34)
MCHC RBC-ENTMCNC: 32.9 GM/DL — SIGNIFICANT CHANGE UP (ref 32–36)
MCHC RBC-ENTMCNC: 33.3 GM/DL — SIGNIFICANT CHANGE UP (ref 32–36)
MCV RBC AUTO: 94 FL — SIGNIFICANT CHANGE UP (ref 80–100)
MCV RBC AUTO: 96.2 FL — SIGNIFICANT CHANGE UP (ref 80–100)
MONOCYTES # BLD AUTO: 0.66 K/UL — SIGNIFICANT CHANGE UP (ref 0–0.9)
MONOCYTES # BLD AUTO: 0.85 K/UL — SIGNIFICANT CHANGE UP (ref 0–0.9)
MONOCYTES NFR BLD AUTO: 10.4 % — SIGNIFICANT CHANGE UP (ref 2–14)
MONOCYTES NFR BLD AUTO: 9 % — SIGNIFICANT CHANGE UP (ref 2–14)
NEUTROPHILS # BLD AUTO: 5.39 K/UL — SIGNIFICANT CHANGE UP (ref 1.8–7.4)
NEUTROPHILS # BLD AUTO: 6.07 K/UL — SIGNIFICANT CHANGE UP (ref 1.8–7.4)
NEUTROPHILS NFR BLD AUTO: 73.9 % — SIGNIFICANT CHANGE UP (ref 43–77)
NEUTROPHILS NFR BLD AUTO: 74.3 % — SIGNIFICANT CHANGE UP (ref 43–77)
NITRITE UR-MCNC: NEGATIVE — SIGNIFICANT CHANGE UP
PH UR: 7 — SIGNIFICANT CHANGE UP (ref 5–8)
PLATELET # BLD AUTO: 187 K/UL — SIGNIFICANT CHANGE UP (ref 150–400)
PLATELET # BLD AUTO: 208 K/UL — SIGNIFICANT CHANGE UP (ref 150–400)
POTASSIUM SERPL-MCNC: 3.8 MMOL/L — SIGNIFICANT CHANGE UP (ref 3.5–5.3)
POTASSIUM SERPL-MCNC: 3.9 MMOL/L — SIGNIFICANT CHANGE UP (ref 3.5–5.3)
POTASSIUM SERPL-SCNC: 3.8 MMOL/L — SIGNIFICANT CHANGE UP (ref 3.5–5.3)
POTASSIUM SERPL-SCNC: 3.9 MMOL/L — SIGNIFICANT CHANGE UP (ref 3.5–5.3)
PROT SERPL-MCNC: 6.5 GM/DL — SIGNIFICANT CHANGE UP (ref 6–8.3)
PROT SERPL-MCNC: 6.9 GM/DL — SIGNIFICANT CHANGE UP (ref 6–8.3)
PROT UR-MCNC: 100
RBC # BLD: 2.88 M/UL — LOW (ref 4.2–5.8)
RBC # BLD: 3 M/UL — LOW (ref 4.2–5.8)
RBC # FLD: 12.7 % — SIGNIFICANT CHANGE UP (ref 10.3–14.5)
RBC # FLD: 12.7 % — SIGNIFICANT CHANGE UP (ref 10.3–14.5)
RSV RNA NPH QL NAA+NON-PROBE: SIGNIFICANT CHANGE UP
SARS-COV-2 RNA SPEC QL NAA+PROBE: SIGNIFICANT CHANGE UP
SODIUM SERPL-SCNC: 135 MMOL/L — SIGNIFICANT CHANGE UP (ref 135–145)
SODIUM SERPL-SCNC: 138 MMOL/L — SIGNIFICANT CHANGE UP (ref 135–145)
SP GR SPEC: 1 — LOW (ref 1.01–1.02)
UROBILINOGEN FLD QL: NEGATIVE — SIGNIFICANT CHANGE UP
WBC # BLD: 7.3 K/UL — SIGNIFICANT CHANGE UP (ref 3.8–10.5)
WBC # BLD: 8.18 K/UL — SIGNIFICANT CHANGE UP (ref 3.8–10.5)
WBC # FLD AUTO: 7.3 K/UL — SIGNIFICANT CHANGE UP (ref 3.8–10.5)
WBC # FLD AUTO: 8.18 K/UL — SIGNIFICANT CHANGE UP (ref 3.8–10.5)

## 2022-12-01 PROCEDURE — 12345: CPT | Mod: NC

## 2022-12-01 PROCEDURE — 84100 ASSAY OF PHOSPHORUS: CPT

## 2022-12-01 PROCEDURE — 74177 CT ABD & PELVIS W/CONTRAST: CPT | Mod: 26,MA

## 2022-12-01 PROCEDURE — 83540 ASSAY OF IRON: CPT

## 2022-12-01 PROCEDURE — U0005: CPT

## 2022-12-01 PROCEDURE — A9562: CPT

## 2022-12-01 PROCEDURE — 99285 EMERGENCY DEPT VISIT HI MDM: CPT

## 2022-12-01 PROCEDURE — 80048 BASIC METABOLIC PNL TOTAL CA: CPT

## 2022-12-01 PROCEDURE — 83550 IRON BINDING TEST: CPT

## 2022-12-01 PROCEDURE — 81001 URINALYSIS AUTO W/SCOPE: CPT

## 2022-12-01 PROCEDURE — 99222 1ST HOSP IP/OBS MODERATE 55: CPT

## 2022-12-01 PROCEDURE — U0003: CPT

## 2022-12-01 PROCEDURE — 36415 COLL VENOUS BLD VENIPUNCTURE: CPT

## 2022-12-01 PROCEDURE — 82962 GLUCOSE BLOOD TEST: CPT

## 2022-12-01 PROCEDURE — 85027 COMPLETE CBC AUTOMATED: CPT

## 2022-12-01 PROCEDURE — 82607 VITAMIN B-12: CPT

## 2022-12-01 PROCEDURE — 85014 HEMATOCRIT: CPT

## 2022-12-01 PROCEDURE — 82728 ASSAY OF FERRITIN: CPT

## 2022-12-01 PROCEDURE — 99223 1ST HOSP IP/OBS HIGH 75: CPT

## 2022-12-01 PROCEDURE — 82746 ASSAY OF FOLIC ACID SERUM: CPT

## 2022-12-01 PROCEDURE — 83735 ASSAY OF MAGNESIUM: CPT

## 2022-12-01 PROCEDURE — 78708 K FLOW/FUNCT IMAGE W/DRUG: CPT

## 2022-12-01 PROCEDURE — 87086 URINE CULTURE/COLONY COUNT: CPT

## 2022-12-01 PROCEDURE — 85018 HEMOGLOBIN: CPT

## 2022-12-01 RX ORDER — INSULIN LISPRO 100/ML
VIAL (ML) SUBCUTANEOUS
Refills: 0 | Status: DISCONTINUED | OUTPATIENT
Start: 2022-12-01 | End: 2022-12-03

## 2022-12-01 RX ORDER — DEXTROSE 50 % IN WATER 50 %
12.5 SYRINGE (ML) INTRAVENOUS ONCE
Refills: 0 | Status: DISCONTINUED | OUTPATIENT
Start: 2022-12-01 | End: 2022-12-03

## 2022-12-01 RX ORDER — LEVOTHYROXINE SODIUM 125 MCG
1 TABLET ORAL
Qty: 0 | Refills: 0 | DISCHARGE

## 2022-12-01 RX ORDER — LANOLIN ALCOHOL/MO/W.PET/CERES
3 CREAM (GRAM) TOPICAL AT BEDTIME
Refills: 0 | Status: DISCONTINUED | OUTPATIENT
Start: 2022-12-01 | End: 2022-12-08

## 2022-12-01 RX ORDER — DEXTROSE 50 % IN WATER 50 %
25 SYRINGE (ML) INTRAVENOUS ONCE
Refills: 0 | Status: DISCONTINUED | OUTPATIENT
Start: 2022-12-01 | End: 2022-12-03

## 2022-12-01 RX ORDER — ACETAMINOPHEN 500 MG
650 TABLET ORAL EVERY 6 HOURS
Refills: 0 | Status: DISCONTINUED | OUTPATIENT
Start: 2022-12-01 | End: 2022-12-08

## 2022-12-01 RX ORDER — SODIUM CHLORIDE 9 MG/ML
1000 INJECTION INTRAMUSCULAR; INTRAVENOUS; SUBCUTANEOUS ONCE
Refills: 0 | Status: COMPLETED | OUTPATIENT
Start: 2022-12-01 | End: 2022-12-01

## 2022-12-01 RX ORDER — ONDANSETRON 8 MG/1
4 TABLET, FILM COATED ORAL EVERY 8 HOURS
Refills: 0 | Status: DISCONTINUED | OUTPATIENT
Start: 2022-12-01 | End: 2022-12-08

## 2022-12-01 RX ORDER — LATANOPROST 0.05 MG/ML
1 SOLUTION/ DROPS OPHTHALMIC; TOPICAL AT BEDTIME
Refills: 0 | Status: DISCONTINUED | OUTPATIENT
Start: 2022-12-01 | End: 2022-12-08

## 2022-12-01 RX ORDER — FINASTERIDE 5 MG/1
5 TABLET, FILM COATED ORAL DAILY
Refills: 0 | Status: DISCONTINUED | OUTPATIENT
Start: 2022-12-01 | End: 2022-12-08

## 2022-12-01 RX ORDER — SODIUM CHLORIDE 9 MG/ML
1000 INJECTION, SOLUTION INTRAVENOUS
Refills: 0 | Status: DISCONTINUED | OUTPATIENT
Start: 2022-12-01 | End: 2022-12-03

## 2022-12-01 RX ORDER — LEVOTHYROXINE SODIUM 125 MCG
50 TABLET ORAL DAILY
Refills: 0 | Status: DISCONTINUED | OUTPATIENT
Start: 2022-12-01 | End: 2022-12-08

## 2022-12-01 RX ORDER — GLUCAGON INJECTION, SOLUTION 0.5 MG/.1ML
1 INJECTION, SOLUTION SUBCUTANEOUS ONCE
Refills: 0 | Status: DISCONTINUED | OUTPATIENT
Start: 2022-12-01 | End: 2022-12-03

## 2022-12-01 RX ORDER — DEXTROSE 50 % IN WATER 50 %
15 SYRINGE (ML) INTRAVENOUS ONCE
Refills: 0 | Status: DISCONTINUED | OUTPATIENT
Start: 2022-12-01 | End: 2022-12-03

## 2022-12-01 RX ORDER — INSULIN LISPRO 100/ML
VIAL (ML) SUBCUTANEOUS AT BEDTIME
Refills: 0 | Status: DISCONTINUED | OUTPATIENT
Start: 2022-12-01 | End: 2022-12-03

## 2022-12-01 RX ORDER — LIDOCAINE AND PRILOCAINE CREAM 25; 25 MG/G; MG/G
1 CREAM TOPICAL ONCE
Refills: 0 | Status: COMPLETED | OUTPATIENT
Start: 2022-12-01 | End: 2022-12-01

## 2022-12-01 RX ORDER — VANCOMYCIN HCL 1 G
750 VIAL (EA) INTRAVENOUS ONCE
Refills: 0 | Status: DISCONTINUED | OUTPATIENT
Start: 2022-12-01 | End: 2022-12-01

## 2022-12-01 RX ORDER — NETARSUDIL 0.2 MG/ML
1 SOLUTION/ DROPS OPHTHALMIC; TOPICAL
Qty: 0 | Refills: 0 | DISCHARGE

## 2022-12-01 RX ORDER — CEFEPIME 1 G/1
2000 INJECTION, POWDER, FOR SOLUTION INTRAMUSCULAR; INTRAVENOUS ONCE
Refills: 0 | Status: COMPLETED | OUTPATIENT
Start: 2022-12-01 | End: 2022-12-01

## 2022-12-01 RX ORDER — CLOTRIMAZOLE AND BETAMETHASONE DIPROPIONATE 10; .5 MG/G; MG/G
1 CREAM TOPICAL
Refills: 0 | Status: DISCONTINUED | OUTPATIENT
Start: 2022-12-01 | End: 2022-12-08

## 2022-12-01 RX ORDER — LOVASTATIN 20 MG
1 TABLET ORAL
Qty: 0 | Refills: 0 | DISCHARGE

## 2022-12-01 RX ORDER — ASPIRIN/CALCIUM CARB/MAGNESIUM 324 MG
81 TABLET ORAL DAILY
Refills: 0 | Status: DISCONTINUED | OUTPATIENT
Start: 2022-12-01 | End: 2022-12-08

## 2022-12-01 RX ORDER — MORPHINE SULFATE 50 MG/1
2 CAPSULE, EXTENDED RELEASE ORAL ONCE
Refills: 0 | Status: DISCONTINUED | OUTPATIENT
Start: 2022-12-01 | End: 2022-12-01

## 2022-12-01 RX ORDER — LATANOPROST 0.05 MG/ML
1 SOLUTION/ DROPS OPHTHALMIC; TOPICAL
Qty: 0 | Refills: 0 | DISCHARGE

## 2022-12-01 RX ORDER — ASPIRIN/CALCIUM CARB/MAGNESIUM 324 MG
1 TABLET ORAL
Qty: 0 | Refills: 0 | DISCHARGE

## 2022-12-01 RX ORDER — CEFEPIME 1 G/1
2000 INJECTION, POWDER, FOR SOLUTION INTRAMUSCULAR; INTRAVENOUS EVERY 12 HOURS
Refills: 0 | Status: DISCONTINUED | OUTPATIENT
Start: 2022-12-01 | End: 2022-12-08

## 2022-12-01 RX ORDER — TAMSULOSIN HYDROCHLORIDE 0.4 MG/1
0.4 CAPSULE ORAL AT BEDTIME
Refills: 0 | Status: DISCONTINUED | OUTPATIENT
Start: 2022-12-01 | End: 2022-12-08

## 2022-12-01 RX ORDER — VANCOMYCIN HCL 1 G
1000 VIAL (EA) INTRAVENOUS ONCE
Refills: 0 | Status: COMPLETED | OUTPATIENT
Start: 2022-12-01 | End: 2022-12-01

## 2022-12-01 RX ORDER — METFORMIN HYDROCHLORIDE 850 MG/1
1 TABLET ORAL
Qty: 0 | Refills: 0 | DISCHARGE

## 2022-12-01 RX ADMIN — Medication 250 MILLIGRAM(S): at 07:21

## 2022-12-01 RX ADMIN — TAMSULOSIN HYDROCHLORIDE 0.4 MILLIGRAM(S): 0.4 CAPSULE ORAL at 22:17

## 2022-12-01 RX ADMIN — SODIUM CHLORIDE 1000 MILLILITER(S): 9 INJECTION INTRAMUSCULAR; INTRAVENOUS; SUBCUTANEOUS at 03:05

## 2022-12-01 RX ADMIN — Medication 3 MILLIGRAM(S): at 22:17

## 2022-12-01 RX ADMIN — Medication 81 MILLIGRAM(S): at 07:23

## 2022-12-01 RX ADMIN — CEFEPIME 100 MILLIGRAM(S): 1 INJECTION, POWDER, FOR SOLUTION INTRAMUSCULAR; INTRAVENOUS at 22:17

## 2022-12-01 RX ADMIN — Medication 2: at 12:31

## 2022-12-01 RX ADMIN — CLOTRIMAZOLE AND BETAMETHASONE DIPROPIONATE 1 APPLICATION(S): 10; .5 CREAM TOPICAL at 17:16

## 2022-12-01 RX ADMIN — Medication 50 MICROGRAM(S): at 07:20

## 2022-12-01 RX ADMIN — LIDOCAINE AND PRILOCAINE CREAM 1 APPLICATION(S): 25; 25 CREAM TOPICAL at 01:28

## 2022-12-01 RX ADMIN — CEFEPIME 100 MILLIGRAM(S): 1 INJECTION, POWDER, FOR SOLUTION INTRAMUSCULAR; INTRAVENOUS at 05:25

## 2022-12-01 RX ADMIN — MORPHINE SULFATE 2 MILLIGRAM(S): 50 CAPSULE, EXTENDED RELEASE ORAL at 04:16

## 2022-12-01 RX ADMIN — LATANOPROST 1 DROP(S): 0.05 SOLUTION/ DROPS OPHTHALMIC; TOPICAL at 22:17

## 2022-12-01 NOTE — H&P ADULT - HISTORY OF PRESENT ILLNESS
77-year-old Male with history of dementia presented with penile swelling, redness and pain. Patient with dementia unable to contribute to HPI.  Per nursing home staff they stated he had some swelling in penile area since November 27 which had only been worsening over the past few days. No fever. Hill last changed on 27 November as well.  Patient does not recall if he is circumcised or not. No other complain.     Family Hx:  Unable to get any detail family history from the patient as he is demented.

## 2022-12-01 NOTE — PROGRESS NOTE ADULT - SUBJECTIVE AND OBJECTIVE BOX
CC: Penile swelling  History of Present Illness:     77-year-old Male with history of dementia presented with penile swelling, redness and pain. Patient with dementia unable to contribute to HPI.  Per nursing home staff they stated he had some swelling in penile area since  which had only been worsening over the past few days. No fever. Woodson last changed on  as well.  Patient does not recall if he is circumcised or not. No other complain.       s:  : In bed, comfortable, confused at baseline.      REVIEW OF SYSTEMS: All other review of systems is negative unless indicated above.    Physical Exam:   Vital Signs Last 24 Hrs  T(C): 36.8 (01 Dec 2022 10:30), Max: 37.3 (01 Dec 2022 00:02)  T(F): 98.2 (01 Dec 2022 10:30), Max: 99.1 (01 Dec 2022 00:02)  HR: 72 (01 Dec 2022 10:30) (72 - 85)  BP: 132/72 (01 Dec 2022 10:30) (113/69 - 132/72)  BP(mean): 84 (01 Dec 2022 07:51) (84 - 84)  RR: 17 (01 Dec 2022 10:30) (16 - 18)  SpO2: 98% (01 Dec 2022 10:30) (98% - 100%)    Parameters below as of 01 Dec 2022 10:30  Patient On (Oxygen Delivery Method): room air      PHYSICAL EXAM:    Constitutional: NAD, awake and alert,   HEENT: PERR, EOMI, Normal Hearing, MMM  Neck: Soft and supple  Respiratory: Breath sounds are clear bilaterally, No wheezing, rales or rhonchi  Cardiovascular: S1 and S2, regular rate and rhythm, no Murmurs, gallops or rubs  Gastrointestinal: Bowel Sounds present, soft, nontender, nondistended, no guarding, no rebound  Extremities: No peripheral edema  : Penile swelling, erythema, woodson in place  Neurological: A/O x 1, no focal deficits in my limited exam        MEDICATIONS  (STANDING):  aspirin enteric coated 81 milliGRAM(s) Oral daily  cefepime   IVPB 2000 milliGRAM(s) IV Intermittent every 12 hours  clotrimazole/betamethasone Cream 1 Application(s) Topical two times a day  dextrose 5%. 1000 milliLiter(s) (100 mL/Hr) IV Continuous <Continuous>  dextrose 5%. 1000 milliLiter(s) (50 mL/Hr) IV Continuous <Continuous>  dextrose 50% Injectable 25 Gram(s) IV Push once  dextrose 50% Injectable 12.5 Gram(s) IV Push once  dextrose 50% Injectable 25 Gram(s) IV Push once  glucagon  Injectable 1 milliGRAM(s) IntraMuscular once  insulin lispro (ADMELOG) corrective regimen sliding scale   SubCutaneous three times a day before meals  insulin lispro (ADMELOG) corrective regimen sliding scale   SubCutaneous at bedtime  latanoprost 0.005% Ophthalmic Solution 1 Drop(s) Both EYES at bedtime  levothyroxine 50 MICROGram(s) Oral daily  tamsulosin 0.4 milliGRAM(s) Oral at bedtime    MEDICATIONS  (PRN):  acetaminophen     Tablet .. 650 milliGRAM(s) Oral every 6 hours PRN Temp greater or equal to 38C (100.4F), Mild Pain (1 - 3)  aluminum hydroxide/magnesium hydroxide/simethicone Suspension 30 milliLiter(s) Oral every 4 hours PRN Dyspepsia  dextrose Oral Gel 15 Gram(s) Oral once PRN Blood Glucose LESS THAN 70 milliGRAM(s)/deciliter  melatonin 3 milliGRAM(s) Oral at bedtime PRN Insomnia  ondansetron Injectable 4 milliGRAM(s) IV Push every 8 hours PRN Nausea and/or Vomiting                              9.1    7.30  )-----------( 208      ( 01 Dec 2022 03:10 )             27.7     12-    138  |  106  |  30<H>  ----------------------------<  101<H>  3.8   |  28  |  0.95    Ca    9.4      01 Dec 2022 03:10    TPro  6.9  /  Alb  2.8<L>  /  TBili  0.3  /  DBili  x   /  AST  9<L>  /  ALT  18  /  AlkPhos  72  12    CAPILLARY BLOOD GLUCOSE      POCT Blood Glucose.: 155 mg/dL (01 Dec 2022 12:29)  POCT Blood Glucose.: 99 mg/dL (01 Dec 2022 07:21)    LIVER FUNCTIONS - ( 01 Dec 2022 03:10 )  Alb: 2.8 g/dL / Pro: 6.9 gm/dL / ALK PHOS: 72 U/L / ALT: 18 U/L / AST: 9 U/L / GGT: x             Urinalysis Basic - ( 01 Dec 2022 05:24 )    Color: Yellow / Appearance: Clear / S.005 / pH: x  Gluc: x / Ketone: Negative  / Bili: Negative / Urobili: Negative   Blood: x / Protein: 100 / Nitrite: Negative   Leuk Esterase: Moderate / RBC: 25-50 /HPF / WBC 11-25   Sq Epi: x / Non Sq Epi: Occasional / Bacteria: Few              Assessment/Plan:  77 year old man with penile infection / UTI:      Penile infection / cellulitis / balanitis / UTI / right hydronephrosis:  -CT noted  -woodson placed in ED  -c/w cefepime dose adjusted per ID  -f/u cultures  -urology following  -supportive care      Anemia: Stable  -no active GI bleeding, Guaiac -neg  -may be AOCD  -may do more work up as outpatient if needed      SCD for DVT ppx      Code status: Full code.

## 2022-12-01 NOTE — ED PROVIDER NOTE - PHYSICAL EXAMINATION
***GEN - NAD; well appearing; A+O x1 ***HEAD - NC/AT ***EYES/NOSE - PERRL, EOMI, mucous membranes moist, no discharge ***THROAT: Oral cavity and pharynx normal. No inflammation, swelling, exudate, or lesions.  ***NECK: Neck supple, non-tender without lymphadenopathy, no masses, no thyromegaly.   ***PULMONARY - CTA b/l, symmetric breath sounds. ***CARDIAC -s1s2, RRR, no M,G,R  ***ABDOMEN - +BS, ND, NT, soft, no guarding, no rebound, no masses  *** -  very swollen and tender glands penis with malodorous discharge and cloudy urine in woodson  ***BACK - no CVA tenderness, Normal  spine ***EXTREMITIES - symmetric pulses, 2+ dp, capillary refill < 2 seconds, no clubbing, no cyanosis, no edema ***SKIN - no rash or bruising   ***NEUROLOGIC - alert, CN 2-12 intact

## 2022-12-01 NOTE — PATIENT PROFILE ADULT - FALL HARM RISK - HARM RISK INTERVENTIONS

## 2022-12-01 NOTE — ED ADULT NURSE REASSESSMENT NOTE - NS ED NURSE REASSESS COMMENT FT1
Report received from NADINE Cannon. Pt resting comfortably in bed, denies any pain/ symptoms at this time, as per MD Reed "Do not remove his woodson catheter. Urology needs to see patient first, pt has too much swelling at this time, I believe urology will have to replace anyway." vital signs stable, will reassess. Report received from NADINE Cannon. Pt resting comfortably in bed, denies any pain/ symptoms at this time, vital signs stable, will reassess.

## 2022-12-01 NOTE — ED PROVIDER NOTE - OBJECTIVE STATEMENT
77-year-old male with history of dementia, diabetes, BPH, chronic Hill, hyperlipidemia presents with penile swelling and pain.  Patient with dementia unable to contribute to HPI.  Per nursing home staff they state he has had some swelling there since November 27 which has only been worsening over the past few days.  No fever.  Hill last changed on 27 November as well.  Patient does not recall if he is circumcised or not.

## 2022-12-01 NOTE — H&P ADULT - NSHPPHYSICALEXAM_GEN_ALL_CORE
T(C): 36.9 (12-01-22 @ 05:22), Max: 37.3 (12-01-22 @ 00:02)  HR: 80 (12-01-22 @ 05:22) (80 - 85)  BP: 129/75 (12-01-22 @ 05:22) (125/69 - 129/75)  RR: 16 (12-01-22 @ 05:22) (16 - 18)  SpO2: 100% (12-01-22 @ 05:22) (99% - 100%)    CONSTITUTIONAL: Well groomed, no apparent distress  EYES: PERRLA and symmetric, EOMI, No conjunctival or scleral injection, non-icteric  ENMT: Oral mucosa with moist membranes. Normal dentition; no pharyngeal injection or exudates             NECK: Supple, symmetric and without tracheal deviation   RESP: No respiratory distress, no use of accessory muscles; CTA b/l, no WRR  CV: RRR, +S1S2, no MRG; no JVD; no peripheral edema  GI: Soft, NT, ND, no rebound, no guarding; no palpable masses; no hepatosplenomegaly; no hernia palpated, Hill cath in place.  Penile swelling, redness and tenderness in the glans area.   LYMPH: No cervical LAD or tenderness; no axillary LAD or tenderness; no inguinal LAD or tenderness  MSK: Normal ROM without pain, no spinal tenderness, normal muscle strength/tone  SKIN: No rashes or ulcers noted; no subcutaneous nodules or induration palpable  NEURO: CN II-XII intact; normal reflexes in upper and lower extremities, sensation intact in upper and lower extremities b/l to light touch   PSYCH: Unable to assess as patient is demented.

## 2022-12-01 NOTE — CONSULT NOTE ADULT - SUBJECTIVE AND OBJECTIVE BOX
CHIEF COMPLAINT:    HISTORY OF PRESENT ILLNESS:    PAST MEDICAL & SURGICAL HISTORY:  HTN (hypertension)      Hypothyroid      Glaucoma      DM (diabetes mellitus)      No significant past surgical history          REVIEW OF SYSTEMS:    CONSTITUTIONAL: No weakness, fevers or chills  EYES/ENT: No visual changes;  No vertigo or throat pain   NECK: No pain or stiffness  RESPIRATORY: No cough, wheezing, hemoptysis, No shortness of breath  CARDIOVASCULAR: No chest pain or palpitations  GASTROINTESTINAL: No abdominal or flank pain, No nausea, vomiting, diarrhea or constipation  GENITOURINARY: See HPI  NEUROLOGICAL: No numbness or weakness  SKIN: No rashes or lesions   All other review of systems is negative unless indicated above.    MEDICATIONS  (STANDING):  aspirin enteric coated 81 milliGRAM(s) Oral daily  cefepime   IVPB 2000 milliGRAM(s) IV Intermittent every 12 hours  clotrimazole/betamethasone Cream 1 Application(s) Topical two times a day  dextrose 5%. 1000 milliLiter(s) (100 mL/Hr) IV Continuous <Continuous>  dextrose 5%. 1000 milliLiter(s) (50 mL/Hr) IV Continuous <Continuous>  dextrose 50% Injectable 25 Gram(s) IV Push once  dextrose 50% Injectable 12.5 Gram(s) IV Push once  dextrose 50% Injectable 25 Gram(s) IV Push once  glucagon  Injectable 1 milliGRAM(s) IntraMuscular once  insulin lispro (ADMELOG) corrective regimen sliding scale   SubCutaneous three times a day before meals  insulin lispro (ADMELOG) corrective regimen sliding scale   SubCutaneous at bedtime  latanoprost 0.005% Ophthalmic Solution 1 Drop(s) Both EYES at bedtime  levothyroxine 50 MICROGram(s) Oral daily  tamsulosin 0.4 milliGRAM(s) Oral at bedtime    MEDICATIONS  (PRN):  acetaminophen     Tablet .. 650 milliGRAM(s) Oral every 6 hours PRN Temp greater or equal to 38C (100.4F), Mild Pain (1 - 3)  aluminum hydroxide/magnesium hydroxide/simethicone Suspension 30 milliLiter(s) Oral every 4 hours PRN Dyspepsia  dextrose Oral Gel 15 Gram(s) Oral once PRN Blood Glucose LESS THAN 70 milliGRAM(s)/deciliter  melatonin 3 milliGRAM(s) Oral at bedtime PRN Insomnia  ondansetron Injectable 4 milliGRAM(s) IV Push every 8 hours PRN Nausea and/or Vomiting      Allergies    No Known Allergies    Intolerances        SOCIAL HISTORY:    FAMILY HISTORY:  FH: HTN (hypertension) (Father)        Vital Signs Last 24 Hrs  T(C): 36.7 (01 Dec 2022 07:51), Max: 37.3 (01 Dec 2022 00:02)  T(F): 98 (01 Dec 2022 07:51), Max: 99.1 (01 Dec 2022 00:02)  HR: 81 (01 Dec 2022 07:51) (80 - 85)  BP: 113/69 (01 Dec 2022 07:51) (113/69 - 129/75)  BP(mean): 84 (01 Dec 2022 07:51) (84 - 84)  RR: 17 (01 Dec 2022 07:51) (16 - 18)  SpO2: 99% (01 Dec 2022 07:51) (99% - 100%)    Parameters below as of 01 Dec 2022 07:51  Patient On (Oxygen Delivery Method): room air        PHYSICAL EXAM:    Constitutional: No acute distress  HEENT: EOMI, Normal Hearing  Neck: Supple  Back: No costovertebral angle tenderness  Respiratory: Normal respiratory effort    Cardiovascular: Normal peripheral circulation   Abd: Soft, non distended, non tender  : Normal urethra, ***circumcised penis, bilateral normal to palpate  NAOMI: Prostate- *** gms, non tender, no nodules  Extremities: No peripheral edema  Neurological: No focal deficits  Psychiatric: Normal mood, normal affect  Musculoskeletal: Moving all 4 extremities  Skin: No rashes    LABS:                        9.1    7.30  )-----------( 208      ( 01 Dec 2022 03:10 )             27.7     12    138  |  106  |  30<H>  ----------------------------<  101<H>  3.8   |  28  |  0.95    Ca    9.4      01 Dec 2022 03:10    TPro  6.9  /  Alb  2.8<L>  /  TBili  0.3  /  DBili  x   /  AST  9<L>  /  ALT  18  /  AlkPhos  72  12      Urinalysis Basic - ( 01 Dec 2022 05:24 )    Color: Yellow / Appearance: Clear / S.005 / pH: x  Gluc: x / Ketone: Negative  / Bili: Negative / Urobili: Negative   Blood: x / Protein: 100 / Nitrite: Negative   Leuk Esterase: Moderate / RBC: 25-50 /HPF / WBC 11-25   Sq Epi: x / Non Sq Epi: Occasional / Bacteria: Few      Urine Culture:     RADIOLOGY & ADDITIONAL STUDIES: CHIEF COMPLAINT:  Penile swelling     HISTORY OF PRESENT ILLNESS:  76 yo male admitted to the hospital after presenting with penile pain and swelling.   Per nursing home staff they stated he had some swelling in penile area since  which had only been worsening over the past few days.  Has chronic indwelling Hill catheter. Hill last changed on  as well.    Patient has Dementia. Not sure if circumcised.     PAST MEDICAL & SURGICAL HISTORY:  HTN (hypertension)      Hypothyroid      Glaucoma      DM (diabetes mellitus)      No significant past surgical history          REVIEW OF SYSTEMS:  Unable to obtain due to patient condition     MEDICATIONS  (STANDING):  aspirin enteric coated 81 milliGRAM(s) Oral daily  cefepime   IVPB 2000 milliGRAM(s) IV Intermittent every 12 hours  clotrimazole/betamethasone Cream 1 Application(s) Topical two times a day  dextrose 5%. 1000 milliLiter(s) (100 mL/Hr) IV Continuous <Continuous>  dextrose 5%. 1000 milliLiter(s) (50 mL/Hr) IV Continuous <Continuous>  dextrose 50% Injectable 25 Gram(s) IV Push once  dextrose 50% Injectable 12.5 Gram(s) IV Push once  dextrose 50% Injectable 25 Gram(s) IV Push once  glucagon  Injectable 1 milliGRAM(s) IntraMuscular once  insulin lispro (ADMELOG) corrective regimen sliding scale   SubCutaneous three times a day before meals  insulin lispro (ADMELOG) corrective regimen sliding scale   SubCutaneous at bedtime  latanoprost 0.005% Ophthalmic Solution 1 Drop(s) Both EYES at bedtime  levothyroxine 50 MICROGram(s) Oral daily  tamsulosin 0.4 milliGRAM(s) Oral at bedtime    MEDICATIONS  (PRN):  acetaminophen     Tablet .. 650 milliGRAM(s) Oral every 6 hours PRN Temp greater or equal to 38C (100.4F), Mild Pain (1 - 3)  aluminum hydroxide/magnesium hydroxide/simethicone Suspension 30 milliLiter(s) Oral every 4 hours PRN Dyspepsia  dextrose Oral Gel 15 Gram(s) Oral once PRN Blood Glucose LESS THAN 70 milliGRAM(s)/deciliter  melatonin 3 milliGRAM(s) Oral at bedtime PRN Insomnia  ondansetron Injectable 4 milliGRAM(s) IV Push every 8 hours PRN Nausea and/or Vomiting      Allergies    No Known Allergies    Intolerances        SOCIAL HISTORY:    FAMILY HISTORY:  FH: HTN (hypertension) (Father)        Vital Signs Last 24 Hrs  T(C): 36.7 (01 Dec 2022 07:51), Max: 37.3 (01 Dec 2022 00:02)  T(F): 98 (01 Dec 2022 07:51), Max: 99.1 (01 Dec 2022 00:02)  HR: 81 (01 Dec 2022 07:51) (80 - 85)  BP: 113/69 (01 Dec 2022 07:51) (113/69 - 129/75)  BP(mean): 84 (01 Dec 2022 07:51) (84 - 84)  RR: 17 (01 Dec 2022 07:51) (16 - 18)  SpO2: 99% (01 Dec 2022 07:51) (99% - 100%)    Parameters below as of 01 Dec 2022 07:51  Patient On (Oxygen Delivery Method): room air        PHYSICAL EXAM:    Constitutional: No acute distress  HEENT: EOMI, Normal Hearing  Neck: Supple  Back: No costovertebral angle tenderness  Respiratory: Normal respiratory effort    Cardiovascular: Normal peripheral circulation   Abd: Soft, non distended, non tender  : Glans swollen and erythematous along with distal penile with tight mid penile skin   Hill to drainage bag- clear urine   Extremities: No peripheral edema  Neurological: No focal deficits  Psychiatric: Normal mood, normal affect  Musculoskeletal: Moving all 4 extremities  Skin: No rashes    LABS:                        9.1    7.30  )-----------( 208      ( 01 Dec 2022 03:10 )             27.7     12-    138  |  106  |  30<H>  ----------------------------<  101<H>  3.8   |  28  |  0.95    Ca    9.4      01 Dec 2022 03:10    TPro  6.9  /  Alb  2.8<L>  /  TBili  0.3  /  DBili  x   /  AST  9<L>  /  ALT  18  /  AlkPhos  72  12-      Urinalysis Basic - ( 01 Dec 2022 05:24 )    Color: Yellow / Appearance: Clear / S.005 / pH: x  Gluc: x / Ketone: Negative  / Bili: Negative / Urobili: Negative   Blood: x / Protein: 100 / Nitrite: Negative   Leuk Esterase: Moderate / RBC: 25-50 /HPF / WBC 11-25   Sq Epi: x / Non Sq Epi: Occasional / Bacteria: Few    `< from: CT Abdomen and Pelvis w/ IV Cont (22 @ 03:01) >  ACC: [id number] EXAM:  CT ABDOMEN AND PELVIS IC                          PROCEDURE DATE:  2022          INTERPRETATION:  CLINICAL INFORMATION: Pelvic pain question Rhiannon's   gangrene    COMPARISON: CT abdomen pelvis 2022.    CONTRAST/COMPLICATIONS:  IV Contrast: None  90 cc administered   10 cc discarded  Oral Contrast: None  Complications: None    PROCEDURE:  CT of the Abdomen and Pelvis was performed.  Sagittal and coronal reformats were performed.    FINDINGS:  LOWER CHEST: Motion limited evaluation of the visualized lungs. Probable   right basilar minimal associated minimal atelectatic changes.    LIVER: Within normal limits.  BILE DUCTS: Normal caliber.  GALLBLADDER: Within normal limits.  SPLEEN: Within normal limits.  PANCREAS: Within normal limits.  ADRENALS: Within normal limits.  KIDNEYS/URETERS: Severe right hydronephrosis to the level of the right   ureteral pelvic junction. Delayed right nephrogram. No radiopaque stones   seen. This is similar to the previous exam. Small left renal cysts. No   left hydronephrosis.    BLADDER: Urinary bladder is mostly collapsed around a Hill catheter   limiting evaluation.  REPRODUCTIVE ORGANS: Prostate is enlarged.    BOWEL: Small hiatal hernia. No bowel obstruction. Appendix is not   visualized. No evidence of inflammation in the pericecal region.  PERITONEUM: No ascites.  VESSELS: Atherosclerotic changes.  RETROPERITONEUM/LYMPH NODES: No lymphadenopathy.  ABDOMINAL WALL: Within normal limits. No evidence for Rhiannon's gangrene   on this examination.  BONES: Degenerative changes. Scoliosis.    IMPRESSION:    Severe right hydronephrosis to the level of the right ureteral pelvic   junction. Delayed right nephrogram. No radiopaque stones seen. This is   similar to the previous exam.    No evidence for Rhiannon's gangrene on this examination.    < end of copied text >

## 2022-12-01 NOTE — CONSULT NOTE ADULT - SUBJECTIVE AND OBJECTIVE BOX
Patient is a 77y old  Male who presents with a chief complaint of Penile swelling and infection (01 Dec 2022 11:16)    HPI:  77-year-old Male with history of dementia presented with penile swelling, redness and pain. Patient with dementia unable to contribute to HPI.  Per nursing home staff they stated he had some swelling in penile area since  which had only been worsening over the past few days. No fever. Hill last changed on  as well.  Patient does not recall if he is circumcised or not. Here Ua with pyuria, Ct abd/pelvis with severe R hydronephrosis to the level of the R ureteral pelvic junction, delayed R nephrogram, no stones, was given IV cefepime.         PMH: as above  PSH: as above  Meds: per reconciliation sheet, noted below  MEDICATIONS  (STANDING):  aspirin enteric coated 81 milliGRAM(s) Oral daily  cefepime   IVPB 2000 milliGRAM(s) IV Intermittent every 12 hours  clotrimazole/betamethasone Cream 1 Application(s) Topical two times a day  dextrose 5%. 1000 milliLiter(s) (100 mL/Hr) IV Continuous <Continuous>  dextrose 5%. 1000 milliLiter(s) (50 mL/Hr) IV Continuous <Continuous>  dextrose 50% Injectable 25 Gram(s) IV Push once  dextrose 50% Injectable 12.5 Gram(s) IV Push once  dextrose 50% Injectable 25 Gram(s) IV Push once  glucagon  Injectable 1 milliGRAM(s) IntraMuscular once  insulin lispro (ADMELOG) corrective regimen sliding scale   SubCutaneous three times a day before meals  insulin lispro (ADMELOG) corrective regimen sliding scale   SubCutaneous at bedtime  latanoprost 0.005% Ophthalmic Solution 1 Drop(s) Both EYES at bedtime  levothyroxine 50 MICROGram(s) Oral daily  tamsulosin 0.4 milliGRAM(s) Oral at bedtime    Allergies    No Known Allergies    Intolerances      Social: no smoking, no alcohol, no illegal drugs; no recent travel, no exposure to TB  FAMILY HISTORY:  FH: HTN (hypertension) (Father)       no history of premature cardiovascular disease in first degree relatives    ROS: the patient denies fever, no chills, no HA, no dizziness, no sore throat, no blurry vision, no CP, no palpitations, no SOB, no cough, no abdominal pain, no diarrhea, no N/V, no dysuria, no leg pain, no claudication, no rash, no joint aches, no rectal pain or bleeding, no night sweats    All other systems reviewed and are negative    Vital Signs Last 24 Hrs  T(C): 36.8 (01 Dec 2022 10:30), Max: 37.3 (01 Dec 2022 00:02)  T(F): 98.2 (01 Dec 2022 10:30), Max: 99.1 (01 Dec 2022 00:02)  HR: 72 (01 Dec 2022 10:30) (72 - 85)  BP: 132/72 (01 Dec 2022 10:30) (113/69 - 132/72)  BP(mean): 84 (01 Dec 2022 07:51) (84 - 84)  RR: 17 (01 Dec 2022 10:30) (16 - 18)  SpO2: 98% (01 Dec 2022 10:30) (98% - 100%)    Parameters below as of 01 Dec 2022 10:30  Patient On (Oxygen Delivery Method): room air      Daily Height in cm: 172.72 (01 Dec 2022 00:02)    Daily     PE:  Constitutional: NAD  HEENT: NC/AT, EOMI, PERRLA, conjunctivae clear; ears and nose atraumatic; pharynx benign  Neck: supple; thyroid not palpable  Back: no tenderness  Respiratory: respiratory effort normal; clear to auscultation  Cardiovascular: S1S2 regular, no murmurs  Abdomen: soft, not tender, not distended, positive BS; liver and spleen WNL  Genitourinary: no suprapubic tenderness  Lymphatic: no LN palpable  Musculoskeletal: no muscle tenderness, no joint swelling or tenderness  Extremities: no pedal edema  Neurological/ Psychiatric: AxOx3, Judgement and insight normal;  moving all extremities  Skin: no rashes; no palpable lesions    Labs: all available labs reviewed                        9.1    7.30  )-----------( 208      ( 01 Dec 2022 03:10 )             27.7     12-    138  |  106  |  30<H>  ----------------------------<  101<H>  3.8   |  28  |  0.95    Ca    9.4      01 Dec 2022 03:10    TPro  6.9  /  Alb  2.8<L>  /  TBili  0.3  /  DBili  x   /  AST  9<L>  /  ALT  18  /  AlkPhos  72       LIVER FUNCTIONS - ( 01 Dec 2022 03:10 )  Alb: 2.8 g/dL / Pro: 6.9 gm/dL / ALK PHOS: 72 U/L / ALT: 18 U/L / AST: 9 U/L / GGT: x           Urinalysis Basic - ( 01 Dec 2022 05:24 )    Color: Yellow / Appearance: Clear / S.005 / pH: x  Gluc: x / Ketone: Negative  / Bili: Negative / Urobili: Negative   Blood: x / Protein: 100 / Nitrite: Negative   Leuk Esterase: Moderate / RBC: 25-50 /HPF / WBC 11-25   Sq Epi: x / Non Sq Epi: Occasional / Bacteria: Few          Radiology: all available radiological tests reviewed  < from: CT Abdomen and Pelvis w/ IV Cont (22 @ 03:01) >  ACC: [id number] EXAM:  CT ABDOMEN AND PELVIS IC                          PROCEDURE DATE:  2022          INTERPRETATION:  CLINICAL INFORMATION: Pelvic pain question Rhiannon's   gangrene    COMPARISON: CT abdomen pelvis 2022.    CONTRAST/COMPLICATIONS:  IV Contrast: None  90 cc administered   10 cc discarded  Oral Contrast: None  Complications: None    PROCEDURE:  CT of the Abdomen and Pelvis was performed.  Sagittal and coronal reformats were performed.    FINDINGS:  LOWER CHEST: Motion limited evaluation of the visualized lungs. Probable   right basilar minimal associated minimal atelectatic changes.    LIVER: Within normal limits.  BILE DUCTS: Normal caliber.  GALLBLADDER: Within normal limits.  SPLEEN: Within normal limits.  PANCREAS: Within normal limits.  ADRENALS: Within normal limits.  KIDNEYS/URETERS: Severe right hydronephrosis to the level of the right   ureteral pelvic junction. Delayed right nephrogram. No radiopaque stones   seen. This is similar to the previous exam. Small left renal cysts. No   left hydronephrosis.    BLADDER: Urinary bladder is mostly collapsed around a Hill catheter   limiting evaluation.  REPRODUCTIVE ORGANS: Prostate is enlarged.    BOWEL: Small hiatal hernia. No bowel obstruction. Appendix is not   visualized. No evidence of inflammation in the pericecal region.  PERITONEUM: No ascites.  VESSELS: Atherosclerotic changes.  RETROPERITONEUM/LYMPH NODES: No lymphadenopathy.  ABDOMINAL WALL: Within normal limits. No evidence for Rhiannon's gangrene   on this examination.  BONES: Degenerative changes. Scoliosis.    IMPRESSION:    Severe right hydronephrosis to the level of the right ureteral pelvic   junction. Delayed right nephrogram. No radiopaque stones seen. This is   similar to the previous exam.    No evidence for Rhiannon's gangrene on this examination.        < end of copied text >    Advanced directives addressed: full resuscitation Patient is a 77y old  Male who presents with a chief complaint of Penile swelling and infection (01 Dec 2022 11:16)    HPI:  77-year-old Male with history of dementia presented with penile swelling, redness and pain. Patient with dementia unable to contribute to HPI.  Per nursing home staff they stated he had some swelling in penile area since  which had only been worsening over the past few days. No fever. Woodson last changed on  as well.  Patient does not recall if he is circumcised or not. Here Ua with pyuria, Ct abd/pelvis with severe R hydronephrosis to the level of the R ureteral pelvic junction, delayed R nephrogram, no stones, was given IV cefepime.         PMH: as above  PSH: as above  Meds: per reconciliation sheet, noted below  MEDICATIONS  (STANDING):  aspirin enteric coated 81 milliGRAM(s) Oral daily  cefepime   IVPB 2000 milliGRAM(s) IV Intermittent every 12 hours  clotrimazole/betamethasone Cream 1 Application(s) Topical two times a day  dextrose 5%. 1000 milliLiter(s) (100 mL/Hr) IV Continuous <Continuous>  dextrose 5%. 1000 milliLiter(s) (50 mL/Hr) IV Continuous <Continuous>  dextrose 50% Injectable 25 Gram(s) IV Push once  dextrose 50% Injectable 12.5 Gram(s) IV Push once  dextrose 50% Injectable 25 Gram(s) IV Push once  glucagon  Injectable 1 milliGRAM(s) IntraMuscular once  insulin lispro (ADMELOG) corrective regimen sliding scale   SubCutaneous three times a day before meals  insulin lispro (ADMELOG) corrective regimen sliding scale   SubCutaneous at bedtime  latanoprost 0.005% Ophthalmic Solution 1 Drop(s) Both EYES at bedtime  levothyroxine 50 MICROGram(s) Oral daily  tamsulosin 0.4 milliGRAM(s) Oral at bedtime    Allergies    No Known Allergies    Intolerances      Social: no smoking, no alcohol, no illegal drugs; no recent travel, no exposure to TB  FAMILY HISTORY:  FH: HTN (hypertension) (Father)       no history of premature cardiovascular disease in first degree relatives    ROS: the patient denies fever, no chills, no HA, no dizziness, no sore throat, no blurry vision, no CP, no palpitations, no SOB, no cough, no abdominal pain, no diarrhea, no N/V, + dysuria, no leg pain, no claudication, no rash, no joint aches, no rectal pain or bleeding, no night sweats + penile pain, redness     All other systems reviewed and are negative    Vital Signs Last 24 Hrs  T(C): 36.8 (01 Dec 2022 10:30), Max: 37.3 (01 Dec 2022 00:02)  T(F): 98.2 (01 Dec 2022 10:30), Max: 99.1 (01 Dec 2022 00:02)  HR: 72 (01 Dec 2022 10:30) (72 - 85)  BP: 132/72 (01 Dec 2022 10:30) (113/69 - 132/72)  BP(mean): 84 (01 Dec 2022 07:51) (84 - 84)  RR: 17 (01 Dec 2022 10:30) (16 - 18)  SpO2: 98% (01 Dec 2022 10:30) (98% - 100%)    Parameters below as of 01 Dec 2022 10:30  Patient On (Oxygen Delivery Method): room air      Daily Height in cm: 172.72 (01 Dec 2022 00:02)    Daily     PE:  Constitutional: NAD  HEENT: NC/AT, EOMI, PERRLA, conjunctivae clear; ears and nose atraumatic; pharynx benign  Neck: supple; thyroid not palpable  Back: no tenderness  Respiratory: respiratory effort normal; clear to auscultation  Cardiovascular: S1S2 regular, no murmurs  Abdomen: soft, not tender, not distended, positive BS; liver and spleen WNL  Genitourinary: no suprapubic tenderness + penile pain, redness woodson in place   Lymphatic: no LN palpable  Musculoskeletal: no muscle tenderness, no joint swelling or tenderness  Extremities: no pedal edema  Neurological/ Psychiatric: AxOx3, Judgement and insight normal;  moving all extremities  Skin: no rashes; no palpable lesions    Labs: all available labs reviewed                        9.1    7.30  )-----------( 208      ( 01 Dec 2022 03:10 )             27.7     12    138  |  106  |  30<H>  ----------------------------<  101<H>  3.8   |  28  |  0.95    Ca    9.4      01 Dec 2022 03:10    TPro  6.9  /  Alb  2.8<L>  /  TBili  0.3  /  DBili  x   /  AST  9<L>  /  ALT  18  /  AlkPhos  72  12     LIVER FUNCTIONS - ( 01 Dec 2022 03:10 )  Alb: 2.8 g/dL / Pro: 6.9 gm/dL / ALK PHOS: 72 U/L / ALT: 18 U/L / AST: 9 U/L / GGT: x           Urinalysis Basic - ( 01 Dec 2022 05:24 )    Color: Yellow / Appearance: Clear / S.005 / pH: x  Gluc: x / Ketone: Negative  / Bili: Negative / Urobili: Negative   Blood: x / Protein: 100 / Nitrite: Negative   Leuk Esterase: Moderate / RBC: 25-50 /HPF / WBC 11-25   Sq Epi: x / Non Sq Epi: Occasional / Bacteria: Few          Radiology: all available radiological tests reviewed  < from: CT Abdomen and Pelvis w/ IV Cont (22 @ 03:01) >  ACC: [id number] EXAM:  CT ABDOMEN AND PELVIS IC                          PROCEDURE DATE:  2022          INTERPRETATION:  CLINICAL INFORMATION: Pelvic pain question Rhiannon's   gangrene    COMPARISON: CT abdomen pelvis 2022.    CONTRAST/COMPLICATIONS:  IV Contrast: None  90 cc administered   10 cc discarded  Oral Contrast: None  Complications: None    PROCEDURE:  CT of the Abdomen and Pelvis was performed.  Sagittal and coronal reformats were performed.    FINDINGS:  LOWER CHEST: Motion limited evaluation of the visualized lungs. Probable   right basilar minimal associated minimal atelectatic changes.    LIVER: Within normal limits.  BILE DUCTS: Normal caliber.  GALLBLADDER: Within normal limits.  SPLEEN: Within normal limits.  PANCREAS: Within normal limits.  ADRENALS: Within normal limits.  KIDNEYS/URETERS: Severe right hydronephrosis to the level of the right   ureteral pelvic junction. Delayed right nephrogram. No radiopaque stones   seen. This is similar to the previous exam. Small left renal cysts. No   left hydronephrosis.    BLADDER: Urinary bladder is mostly collapsed around a Woodson catheter   limiting evaluation.  REPRODUCTIVE ORGANS: Prostate is enlarged.    BOWEL: Small hiatal hernia. No bowel obstruction. Appendix is not   visualized. No evidence of inflammation in the pericecal region.  PERITONEUM: No ascites.  VESSELS: Atherosclerotic changes.  RETROPERITONEUM/LYMPH NODES: No lymphadenopathy.  ABDOMINAL WALL: Within normal limits. No evidence for Rhiannon's gangrene   on this examination.  BONES: Degenerative changes. Scoliosis.    IMPRESSION:    Severe right hydronephrosis to the level of the right ureteral pelvic   junction. Delayed right nephrogram. No radiopaque stones seen. This is   similar to the previous exam.    No evidence for Rhiannon's gangrene on this examination.        < end of copied text >    Advanced directives addressed: full resuscitation

## 2022-12-01 NOTE — ED PROVIDER NOTE - PROGRESS NOTE DETAILS
Brianna WILSON:  noted, guiac negative in ED (lot 231, qc check +, exp 6/30/23). sign out given to hospitalist for admission, Dr. Henson, ALYSHA.

## 2022-12-01 NOTE — PATIENT PROFILE ADULT - NSPROPOAPRESSUREINJURY_GEN_A_NUR
Ok to resume diazepam prn. New Medication or refill(s) ordered, printed and signed. Ready for . Please notify patient if necessary.   Stop flexeril meantime no

## 2022-12-01 NOTE — ED ADULT NURSE NOTE - OBJECTIVE STATEMENT
Sent in from NH for penis swelling, redness and discoloration and pain. Patient states intermittent pain to genital region for about a month but notes it is worse tonight. At triage, swelling noted and yellow pus noted. Extreme tenderness.

## 2022-12-01 NOTE — CONSULT NOTE ADULT - ASSESSMENT
Will get Renogram     Appears Circumcised, concern for penile infection   Continue antibiotics  Lotrisone  Upon   Will get Renogram     Appears Circumcised, concern for penile infection   Continue antibiotics  Lotrisone  Upon review of records: Saw Dr Alfaro as out patient for right hydronephrosis. Concern for right UPJO.   Had Renogram in August 2022: Right 25 % and Left 75%. Concern for severe obstruction on right side and partial obstruction on left. Bladder was severely distended.   Will need repeat Renogram with indwelling Hill catheter. Will try and do before discharge.     Not sure if patient Circumcised, concern for penile infection.   Very tender to touch. Patient not allowing manual reduction.   Continue antibiotics.  Will apply Lotrisone.  Upon review of records: Saw Dr Alfaro as out patient for right hydronephrosis. Concern for right UPJO.   Had Renogram in August 2022: Right 25 % and Left 75%. Concern for severe obstruction on right side and partial obstruction on left. Bladder was severely distended.   Will need repeat Renogram with indwelling Hill catheter. Will try and do before discharge.     Not sure if patient Circumcised, concern for penile infection.   Very tender to touch. Patient not allowing manual reduction.   Continue antibiotics.  Will apply Lotrisone.     On Flomax.   Will start Finasteride.

## 2022-12-01 NOTE — ED ADULT TRIAGE NOTE - CHIEF COMPLAINT QUOTE
Sent in from NH for penis swelling, redness and discoloration and pain. Patient states intermittent pain to genital region for about a month but notes it is worse tonight. At triage, swelling noted and yellow pus noted. Extreme tenderness. VS stable.

## 2022-12-01 NOTE — H&P ADULT - ASSESSMENT
A/P:    1.  Penile Swelling  Penile Infection  UTI  Hydronephrosis  -started on IV Antibiotics  -follow clinically and cultures  -follow ID and Urology consult     2.  Anemia  -no active GI bleeding, Guaiac -neg  -may be AOCD  -may do more work up as outpatient is needed  -Hb seems stable since the admission to the ED    3.  SCD for DVT ppx    4.  Code status: Full code.  A/P:    1.  Penile Swelling  Penile Infection  UTI  Hydronephrosis  -started on IV Antibiotics  -follow clinically and cultures  -follow ID and Urology consult     2.  Anemia  -no active GI bleeding, Guaiac -neg  -may be AOCD  -may do more work up as outpatient if needed  -Hb seems stable since the admission to the ED    3.  SCD for DVT ppx    4.  Code status: Full code.

## 2022-12-01 NOTE — ED ADULT NURSE REASSESSMENT NOTE - NS ED NURSE REASSESS COMMENT FT1
Patient received from previous nurse. Pt is A&Ox2 VSS on RA. Pt is resting comfortably in their bed at this time, denies any pain or discomfort at this time. Pt has a woodson in place draining without any complications at this time. Pt ate breakfast without any complications. Safety and comfort measures in place. Hourly rounding will be done on my time. Will continue to monitor.

## 2022-12-01 NOTE — H&P ADULT - NSVTERISKASSESS_GEN_ALL_CORE FT
"Pt sitting up on stretcher laughing and talking with family members and nurse practitioner.  Pt states " I feel fine, I can go home now "  " Medical Assessment Completed on: 01-Dec-2022 06:06

## 2022-12-01 NOTE — CONSULT NOTE ADULT - ASSESSMENT
77-year-old Male with history of dementia presented with penile swelling, redness and pain. Patient with dementia unable to contribute to HPI.  Per nursing home staff they stated he had some swelling in penile area since November 27 which had only been worsening over the past few days. No fever. Hill last changed on 27 November as well.  Patient does not recall if he is circumcised or not. Here Ua with pyuria, Ct abd/pelvis with severe R hydronephrosis to the level of the R ureteral pelvic junction, delayed R nephrogram, no stones, was given IV cefepime.    77-year-old Male with history of dementia presented with penile swelling, redness and pain. Patient with dementia unable to contribute to HPI.  Per nursing home staff they stated he had some swelling in penile area since November 27 which had only been worsening over the past few days. No fever. Hill last changed on 27 November as well.  Patient does not recall if he is circumcised or not. Here Ua with pyuria, Ct abd/pelvis with severe R hydronephrosis to the level of the R ureteral pelvic junction, delayed R nephrogram, no stones, was given IV cefepime.     1. Pyuria. Complicated UTI. R hydronephrosis. ? R pyelonephritis.  infection  - urology eval appreciated  - agree with IV cefepime adjust dose to [id number]   - continue with antibiotic coverage  - tolerating abx well so far; no side effects noted  - reason for abx use and side effects reviewed with patient  - f/u urine cx, blood cx  - f/u cbc  - supportive care    2. other issues - care per medicine

## 2022-12-01 NOTE — ED PROVIDER NOTE - CLINICAL SUMMARY MEDICAL DECISION MAKING FREE TEXT BOX
77M with penile swelling. appears circumcised, possible balanitis vs paraphimosis if uncircumcised. no necrosis noted. will obtain CT A/p to r/o hattie's gangrene and treat with antibiotics. Urology consult

## 2022-12-02 DIAGNOSIS — N47.2 PARAPHIMOSIS: ICD-10-CM

## 2022-12-02 LAB
CULTURE RESULTS: SIGNIFICANT CHANGE UP
SPECIMEN SOURCE: SIGNIFICANT CHANGE UP

## 2022-12-02 PROCEDURE — 99232 SBSQ HOSP IP/OBS MODERATE 35: CPT

## 2022-12-02 RX ADMIN — FINASTERIDE 5 MILLIGRAM(S): 5 TABLET, FILM COATED ORAL at 09:49

## 2022-12-02 RX ADMIN — CEFEPIME 100 MILLIGRAM(S): 1 INJECTION, POWDER, FOR SOLUTION INTRAMUSCULAR; INTRAVENOUS at 09:49

## 2022-12-02 RX ADMIN — Medication 650 MILLIGRAM(S): at 18:19

## 2022-12-02 RX ADMIN — CEFEPIME 100 MILLIGRAM(S): 1 INJECTION, POWDER, FOR SOLUTION INTRAMUSCULAR; INTRAVENOUS at 22:00

## 2022-12-02 RX ADMIN — CLOTRIMAZOLE AND BETAMETHASONE DIPROPIONATE 1 APPLICATION(S): 10; .5 CREAM TOPICAL at 17:58

## 2022-12-02 RX ADMIN — Medication 81 MILLIGRAM(S): at 09:49

## 2022-12-02 RX ADMIN — Medication 650 MILLIGRAM(S): at 10:19

## 2022-12-02 RX ADMIN — Medication 50 MICROGRAM(S): at 05:27

## 2022-12-02 RX ADMIN — Medication 650 MILLIGRAM(S): at 09:49

## 2022-12-02 RX ADMIN — CLOTRIMAZOLE AND BETAMETHASONE DIPROPIONATE 1 APPLICATION(S): 10; .5 CREAM TOPICAL at 05:27

## 2022-12-02 RX ADMIN — LATANOPROST 1 DROP(S): 0.05 SOLUTION/ DROPS OPHTHALMIC; TOPICAL at 22:00

## 2022-12-02 RX ADMIN — Medication 650 MILLIGRAM(S): at 17:58

## 2022-12-02 RX ADMIN — TAMSULOSIN HYDROCHLORIDE 0.4 MILLIGRAM(S): 0.4 CAPSULE ORAL at 22:01

## 2022-12-02 RX ADMIN — Medication 3 MILLIGRAM(S): at 22:00

## 2022-12-02 NOTE — PROGRESS NOTE ADULT - SUBJECTIVE AND OBJECTIVE BOX
CC: Penile swelling  History of Present Illness:   77-year-old Male with history of dementia presented with penile swelling, redness and pain. Patient with dementia unable to contribute to HPI.  Per nursing home staff they stated he had some swelling in penile area since  which had only been worsening over the past few days. No fever. Woodson last changed on  as well.  Patient does not recall if he is circumcised or not. No other complain.       s:  : In bed, comfortable, confused at baseline.    : No new events, lying in bed, comfortable.  Woodson in place.    REVIEW OF SYSTEMS: All other review of systems is negative unless indicated above.    Physical Exam:   Vital Signs Last 24 Hrs  T(C): 37 (02 Dec 2022 08:13), Max: 37.1 (01 Dec 2022 23:40)  T(F): 98.6 (02 Dec 2022 08:13), Max: 98.8 (01 Dec 2022 23:40)  HR: 71 (02 Dec 2022 08:13) (64 - 90)  BP: 111/65 (02 Dec 2022 08:13) (111/65 - 128/59)  BP(mean): --  RR: 18 (02 Dec 2022 08:13) (18 - 18)  SpO2: 97% (02 Dec 2022 08:13) (97% - 100%)    Parameters below as of 01 Dec 2022 23:40  Patient On (Oxygen Delivery Method): room air        PHYSICAL EXAM:    Constitutional: NAD, awake and alert,   HEENT: PERR, EOMI, Normal Hearing, MMM  Neck: Soft and supple  Respiratory: Breath sounds are clear bilaterally, No wheezing, rales or rhonchi  Cardiovascular: S1 and S2, regular rate and rhythm, no Murmurs, gallops or rubs  Gastrointestinal: Bowel Sounds present, soft, nontender, nondistended, no guarding, no rebound  Extremities: No peripheral edema  : Penile swelling, erythema, woodson in place  Neurological: A/O x 1, no focal deficits in my limited exam      MEDICATIONS  (STANDING):  aspirin enteric coated 81 milliGRAM(s) Oral daily  cefepime   IVPB 2000 milliGRAM(s) IV Intermittent every 12 hours  clotrimazole/betamethasone Cream 1 Application(s) Topical two times a day  dextrose 5%. 1000 milliLiter(s) (100 mL/Hr) IV Continuous <Continuous>  dextrose 5%. 1000 milliLiter(s) (50 mL/Hr) IV Continuous <Continuous>  dextrose 50% Injectable 25 Gram(s) IV Push once  dextrose 50% Injectable 12.5 Gram(s) IV Push once  dextrose 50% Injectable 25 Gram(s) IV Push once  finasteride 5 milliGRAM(s) Oral daily  glucagon  Injectable 1 milliGRAM(s) IntraMuscular once  insulin lispro (ADMELOG) corrective regimen sliding scale   SubCutaneous three times a day before meals  insulin lispro (ADMELOG) corrective regimen sliding scale   SubCutaneous at bedtime  latanoprost 0.005% Ophthalmic Solution 1 Drop(s) Both EYES at bedtime  levothyroxine 50 MICROGram(s) Oral daily  tamsulosin 0.4 milliGRAM(s) Oral at bedtime    MEDICATIONS  (PRN):  acetaminophen     Tablet .. 650 milliGRAM(s) Oral every 6 hours PRN Temp greater or equal to 38C (100.4F), Mild Pain (1 - 3)  aluminum hydroxide/magnesium hydroxide/simethicone Suspension 30 milliLiter(s) Oral every 4 hours PRN Dyspepsia  dextrose Oral Gel 15 Gram(s) Oral once PRN Blood Glucose LESS THAN 70 milliGRAM(s)/deciliter  melatonin 3 milliGRAM(s) Oral at bedtime PRN Insomnia  ondansetron Injectable 4 milliGRAM(s) IV Push every 8 hours PRN Nausea and/or Vomiting                                  9.1    7.30  )-----------( 208      ( 01 Dec 2022 03:10 )             27.7     12    138  |  106  |  30<H>  ----------------------------<  101<H>  3.8   |  28  |  0.95    Ca    9.4      01 Dec 2022 03:10    TPro  6.9  /  Alb  2.8<L>  /  TBili  0.3  /  DBili  x   /  AST  9<L>  /  ALT  18  /  AlkPhos  72  12    CAPILLARY BLOOD GLUCOSE      POCT Blood Glucose.: 112 mg/dL (02 Dec 2022 08:27)  POCT Blood Glucose.: 118 mg/dL (01 Dec 2022 22:12)  POCT Blood Glucose.: 127 mg/dL (01 Dec 2022 17:20)    LIVER FUNCTIONS - ( 01 Dec 2022 03:10 )  Alb: 2.8 g/dL / Pro: 6.9 gm/dL / ALK PHOS: 72 U/L / ALT: 18 U/L / AST: 9 U/L / GGT: x             Urinalysis Basic - ( 01 Dec 2022 05:24 )    Color: Yellow / Appearance: Clear / S.005 / pH: x  Gluc: x / Ketone: Negative  / Bili: Negative / Urobili: Negative   Blood: x / Protein: 100 / Nitrite: Negative   Leuk Esterase: Moderate / RBC: 25-50 /HPF / WBC 11-25   Sq Epi: x / Non Sq Epi: Occasional / Bacteria: Few          Assessment/Plan:  77 year old man with penile infection / UTI:      Penile infection / cellulitis / balanitis / UTI / right hydronephrosis:  -CT noted  -chronic woodson replaced in ED  -c/w cefepime dose adjusted per ID  -BCx no growth x 2  -urology following  -supportive care      Anemia: Stable  -no active GI bleeding, Guaiac -neg  -may be AOCD  -may do more work up as outpatient if needed      SCD for DVT ppx      Code status: Full code.       dispo:  -d/c to [facility] once medically ready

## 2022-12-02 NOTE — PROGRESS NOTE ADULT - ASSESSMENT
A/P: 77y Male s/p Paraphimosis  Surgery PA to check on swelling before 6 pm  Continue woodson for now  Pain meds PRN   Above discussed with Dr. Wetzel

## 2022-12-02 NOTE — PROGRESS NOTE ADULT - SUBJECTIVE AND OBJECTIVE BOX
Pt with paraphimosis  Suzi/coban bandage applied to glans by urology PA  F/U at 5:30 by Surgical PA   F/U at 8:30 by Surgical PA with no significant change    Dr Wetzel to attempt reduction of paraphimosis in AM as discussed

## 2022-12-02 NOTE — PROGRESS NOTE ADULT - SUBJECTIVE AND OBJECTIVE BOX
Pt seen and examined, still with possible phimosis.  Pt not in any pain unless he moves.  Pt with woodson and with no other complaints      Vital Signs Last 24 Hrs  T(C): 36.9 (02 Dec 2022 15:33), Max: 37.1 (01 Dec 2022 23:40)  T(F): 98.5 (02 Dec 2022 15:33), Max: 98.8 (01 Dec 2022 23:40)  HR: 85 (02 Dec 2022 15:33) (64 - 85)  BP: 107/52 (02 Dec 2022 15:33) (107/52 - 117/66)  BP(mean): --  RR: 18 (02 Dec 2022 15:33) (18 - 18)  SpO2: 100% (02 Dec 2022 15:33) (97% - 100%)    Parameters below as of 02 Dec 2022 15:33  Patient On (Oxygen Delivery Method): room air        I&O's Summary    01 Dec 2022 07:01  -  02 Dec 2022 07:00  --------------------------------------------------------  IN: 0 mL / OUT: 1200 mL / NET: -1200 mL    02 Dec 2022 07:01  -  02 Dec 2022 16:10  --------------------------------------------------------  IN: 0 mL / OUT: 850 mL / NET: -850 mL        Physical Exam  Gen: NAD, A&Ox3  Abd: Soft, NT, ND, no bladder palp  : Pt's glans and tissue right beneath glans swollen, appears to be a band around penis not allowing edema to resolve  Placed harsha around glans and top portion of penis and Kobain tape around with some compression  Dr. Wetzel to see in the morning.                           9.1    7.30  )-----------( 208      ( 01 Dec 2022 03:10 )             27.7       12-01    138  |  106  |  30<H>  ----------------------------<  101<H>  3.8   |  28  |  0.95    Ca    9.4      01 Dec 2022 03:10    TPro  6.9  /  Alb  2.8<L>  /  TBili  0.3  /  DBili  x   /  AST  9<L>  /  ALT  18  /  AlkPhos  72  12-01

## 2022-12-02 NOTE — PROGRESS NOTE ADULT - SUBJECTIVE AND OBJECTIVE BOX
Date of service: 22 @ 11:04    pt seen and examined  laying in bed, nad  restless   no fevers   woodson placed for urinary retention    ROS: no fever or chills; denies dizziness, no HA, no SOB or cough, no abdominal pain, no diarrhea or constipation;  no legs pain, no rashes    MEDICATIONS  (STANDING):  aspirin enteric coated 81 milliGRAM(s) Oral daily  cefepime   IVPB 2000 milliGRAM(s) IV Intermittent every 12 hours  clotrimazole/betamethasone Cream 1 Application(s) Topical two times a day  dextrose 5%. 1000 milliLiter(s) (100 mL/Hr) IV Continuous <Continuous>  dextrose 5%. 1000 milliLiter(s) (50 mL/Hr) IV Continuous <Continuous>  dextrose 50% Injectable 25 Gram(s) IV Push once  dextrose 50% Injectable 12.5 Gram(s) IV Push once  dextrose 50% Injectable 25 Gram(s) IV Push once  finasteride 5 milliGRAM(s) Oral daily  glucagon  Injectable 1 milliGRAM(s) IntraMuscular once  insulin lispro (ADMELOG) corrective regimen sliding scale   SubCutaneous three times a day before meals  insulin lispro (ADMELOG) corrective regimen sliding scale   SubCutaneous at bedtime  latanoprost 0.005% Ophthalmic Solution 1 Drop(s) Both EYES at bedtime  levothyroxine 50 MICROGram(s) Oral daily  tamsulosin 0.4 milliGRAM(s) Oral at bedtime    Vital Signs Last 24 Hrs  T(C): 37 (02 Dec 2022 08:13), Max: 37.1 (01 Dec 2022 23:40)  T(F): 98.6 (02 Dec 2022 08:13), Max: 98.8 (01 Dec 2022 23:40)  HR: 71 (02 Dec 2022 08:13) (64 - 90)  BP: 111/65 (02 Dec 2022 08:13) (111/65 - 128/59)  BP(mean): --  RR: 18 (02 Dec 2022 08:13) (18 - 18)  SpO2: 97% (02 Dec 2022 08:13) (97% - 100%)    Parameters below as of 01 Dec 2022 23:40  Patient On (Oxygen Delivery Method): room air    PE:  Constitutional: NAD  HEENT: NC/AT, EOMI, PERRLA, conjunctivae clear; ears and nose atraumatic; pharynx benign  Neck: supple; thyroid not palpable  Back: no tenderness  Respiratory: respiratory effort normal; clear to auscultation  Cardiovascular: S1S2 regular, no murmurs  Abdomen: soft, not tender, not distended, positive BS; liver and spleen WNL  Genitourinary: no suprapubic tenderness + penile pain, redness woodson in place   Lymphatic: no LN palpable  Musculoskeletal: no muscle tenderness, no joint swelling or tenderness  Extremities: no pedal edema  Neurological/ Psychiatric: AxOx3, Judgement and insight normal;  moving all extremities  Skin: no rashes; no palpable lesions    Labs: all available labs reviewed                                   9.1    7.30  )-----------( 208      ( 01 Dec 2022 03:10 )             27.7         138  |  106  |  30<H>  ----------------------------<  101<H>  3.8   |  28  |  0.95    Ca    9.4      01 Dec 2022 03:10    TPro  6.9  /  Alb  2.8<L>  /  TBili  0.3  /  DBili  x   /  AST  9<L>  /  ALT  18  /  AlkPhos  72          LIVER FUNCTIONS - ( 01 Dec 2022 03:10 )  Alb: 2.8 g/dL / Pro: 6.9 gm/dL / ALK PHOS: 72 U/L / ALT: 18 U/L / AST: 9 U/L / GGT: x           Urinalysis Basic - ( 01 Dec 2022 05:24 )    Color: Yellow / Appearance: Clear / S.005 / pH: x  Gluc: x / Ketone: Negative  / Bili: Negative / Urobili: Negative   Blood: x / Protein: 100 / Nitrite: Negative   Leuk Esterase: Moderate / RBC: 25-50 /HPF / WBC 11-25   Sq Epi: x / Non Sq Epi: Occasional / Bacteria: Few    Culture - Blood (22 @ 03:10)   Specimen Source: .Blood None   Culture Results:   No growth to date.   Culture - Blood (22 @ 03:10)   Specimen Source: .Blood None   Culture Results:   No growth to date.       Radiology: all available radiological tests reviewed  < from: CT Abdomen and Pelvis w/ IV Cont (22 @ 03:01) >  ACC: [id number] EXAM:  CT ABDOMEN AND PELVIS IC                          PROCEDURE DATE:  2022          INTERPRETATION:  CLINICAL INFORMATION: Pelvic pain question Rhiannon's   gangrene    COMPARISON: CT abdomen pelvis 2022.    CONTRAST/COMPLICATIONS:  IV Contrast: None  90 cc administered   10 cc discarded  Oral Contrast: None  Complications: None    PROCEDURE:  CT of the Abdomen and Pelvis was performed.  Sagittal and coronal reformats were performed.    FINDINGS:  LOWER CHEST: Motion limited evaluation of the visualized lungs. Probable   right basilar minimal associated minimal atelectatic changes.    LIVER: Within normal limits.  BILE DUCTS: Normal caliber.  GALLBLADDER: Within normal limits.  SPLEEN: Within normal limits.  PANCREAS: Within normal limits.  ADRENALS: Within normal limits.  KIDNEYS/URETERS: Severe right hydronephrosis to the level of the right   ureteral pelvic junction. Delayed right nephrogram. No radiopaque stones   seen. This is similar to the previous exam. Small left renal cysts. No   left hydronephrosis.    BLADDER: Urinary bladder is mostly collapsed around a Woodson catheter   limiting evaluation.  REPRODUCTIVE ORGANS: Prostate is enlarged.    BOWEL: Small hiatal hernia. No bowel obstruction. Appendix is not   visualized. No evidence of inflammation in the pericecal region.  PERITONEUM: No ascites.  VESSELS: Atherosclerotic changes.  RETROPERITONEUM/LYMPH NODES: No lymphadenopathy.  ABDOMINAL WALL: Within normal limits. No evidence for Rhiannon's gangrene   on this examination.  BONES: Degenerative changes. Scoliosis.    IMPRESSION:    Severe right hydronephrosis to the level of the right ureteral pelvic   junction. Delayed right nephrogram. No radiopaque stones seen. This is   similar to the previous exam.    No evidence for Rhiannon's gangrene on this examination.        < end of copied text >    Advanced directives addressed: full resuscitation

## 2022-12-02 NOTE — PROGRESS NOTE ADULT - ASSESSMENT
77-year-old Male with history of dementia presented with penile swelling, redness and pain. Patient with dementia unable to contribute to HPI.  Per nursing home staff they stated he had some swelling in penile area since November 27 which had only been worsening over the past few days. No fever. Hill last changed on 27 November as well.  Patient does not recall if he is circumcised or not. Here Ua with pyuria, Ct abd/pelvis with severe R hydronephrosis to the level of the R ureteral pelvic junction, delayed R nephrogram, no stones, was given IV cefepime.     1. Pyuria. Complicated UTI. R hydronephrosis. ? R pyelonephritis.  infection  - urology eval appreciated  - on IV cefepime  [id number] #2  - continue with antibiotic coverage  - tolerating abx well so far; no side effects noted  - reason for abx use and side effects reviewed with patient  - f/u urine cx, blood cx - no growth   - f/u cbc  - supportive care    2. other issues - care per medicine

## 2022-12-03 DIAGNOSIS — N47.2 PARAPHIMOSIS: ICD-10-CM

## 2022-12-03 PROCEDURE — 54450 PREPUTIAL STRETCHING: CPT

## 2022-12-03 PROCEDURE — 99232 SBSQ HOSP IP/OBS MODERATE 35: CPT

## 2022-12-03 PROCEDURE — 99233 SBSQ HOSP IP/OBS HIGH 50: CPT | Mod: 25

## 2022-12-03 RX ORDER — LIDOCAINE 4 G/100G
1 CREAM TOPICAL ONCE
Refills: 0 | Status: DISCONTINUED | OUTPATIENT
Start: 2022-12-03 | End: 2022-12-03

## 2022-12-03 RX ORDER — LIDOCAINE HCL 20 MG/ML
5 VIAL (ML) INJECTION ONCE
Refills: 0 | Status: DISCONTINUED | OUTPATIENT
Start: 2022-12-03 | End: 2022-12-08

## 2022-12-03 RX ORDER — MORPHINE SULFATE 50 MG/1
2 CAPSULE, EXTENDED RELEASE ORAL ONCE
Refills: 0 | Status: DISCONTINUED | OUTPATIENT
Start: 2022-12-03 | End: 2022-12-03

## 2022-12-03 RX ORDER — MORPHINE SULFATE 50 MG/1
2 CAPSULE, EXTENDED RELEASE ORAL EVERY 4 HOURS
Refills: 0 | Status: DISCONTINUED | OUTPATIENT
Start: 2022-12-03 | End: 2022-12-08

## 2022-12-03 RX ADMIN — Medication 81 MILLIGRAM(S): at 09:25

## 2022-12-03 RX ADMIN — CEFEPIME 100 MILLIGRAM(S): 1 INJECTION, POWDER, FOR SOLUTION INTRAMUSCULAR; INTRAVENOUS at 09:25

## 2022-12-03 RX ADMIN — FINASTERIDE 5 MILLIGRAM(S): 5 TABLET, FILM COATED ORAL at 09:25

## 2022-12-03 RX ADMIN — CLOTRIMAZOLE AND BETAMETHASONE DIPROPIONATE 1 APPLICATION(S): 10; .5 CREAM TOPICAL at 17:17

## 2022-12-03 RX ADMIN — CLOTRIMAZOLE AND BETAMETHASONE DIPROPIONATE 1 APPLICATION(S): 10; .5 CREAM TOPICAL at 06:45

## 2022-12-03 RX ADMIN — Medication 3 MILLIGRAM(S): at 22:57

## 2022-12-03 RX ADMIN — CEFEPIME 100 MILLIGRAM(S): 1 INJECTION, POWDER, FOR SOLUTION INTRAMUSCULAR; INTRAVENOUS at 22:58

## 2022-12-03 RX ADMIN — TAMSULOSIN HYDROCHLORIDE 0.4 MILLIGRAM(S): 0.4 CAPSULE ORAL at 22:57

## 2022-12-03 RX ADMIN — LATANOPROST 1 DROP(S): 0.05 SOLUTION/ DROPS OPHTHALMIC; TOPICAL at 22:57

## 2022-12-03 RX ADMIN — MORPHINE SULFATE 2 MILLIGRAM(S): 50 CAPSULE, EXTENDED RELEASE ORAL at 13:13

## 2022-12-03 RX ADMIN — Medication 50 MICROGRAM(S): at 06:45

## 2022-12-03 RX ADMIN — MORPHINE SULFATE 2 MILLIGRAM(S): 50 CAPSULE, EXTENDED RELEASE ORAL at 12:26

## 2022-12-03 NOTE — PROGRESS NOTE ADULT - SUBJECTIVE AND OBJECTIVE BOX
Patient is a 77y old  Male who presents with a chief complaint of Penile swelling and infection (03 Dec 2022 14:23)      Vital Signs Last 24 Hrs  T(C): 37 (03 Dec 2022 15:08), Max: 37.6 (03 Dec 2022 00:13)  T(F): 98.6 (03 Dec 2022 15:08), Max: 99.7 (03 Dec 2022 00:13)  HR: 72 (03 Dec 2022 15:08) (72 - 78)  BP: 112/57 (03 Dec 2022 15:08) (106/67 - 130/65)  BP(mean): --  RR: 18 (03 Dec 2022 15:08) (18 - 18)  SpO2: 98% (03 Dec 2022 15:08) (96% - 100%)    Parameters below as of 03 Dec 2022 15:08  Patient On (Oxygen Delivery Method): room air  AAO x 3   Normal respiratory effort  Normal peripheral circulation  Abdomen- Soft, ND, NT   : distal penis wrapped in compression dressing, Glans still swollen and erythematous, foreskin retracted behind Glans with edema and erythema, purulent discharge from meatus   Hill in place draining clear urine         LABS:                    Culture Results:   >=3 organisms. Probable collection contamination. (12-01-22 @ 05:24)  Culture Results:   No growth to date. (12-01-22 @ 03:10)  Culture Results:   No growth to date. (12-01-22 @ 03:10)

## 2022-12-03 NOTE — PROGRESS NOTE ADULT - SUBJECTIVE AND OBJECTIVE BOX
CC: Penile swelling  History of Present Illness:   77-year-old Male with history of dementia presented with penile swelling, redness and pain. Patient with dementia unable to contribute to HPI.  Per nursing home staff they stated he had some swelling in penile area since  which had only been worsening over the past few days. No fever. Woodson last changed on  as well.  Patient does not recall if he is circumcised or not. No other complain.       s:  : In bed, comfortable, confused at baseline.    : No new events, lying in bed, comfortable.  Woodson in place.  12/3: Limited ROS/Subjective due to baseline dementia. Pain present at penis. Controlled intermittently. Denies fever, chills, chest pain, SOB, nausea, vomiting, abdominal pain/discomfort.     REVIEW OF SYSTEMS: All other review of systems is negative unless indicated above.    Physical Exam:   T(C): 36.7 (22 @ 09:00), Max: 37.6 (22 @ 00:13)  HR: 78 (22 @ 09:00) (72 - 85)  BP: 130/65 (22 @ 09:00) (106/67 - 130/65)  RR: 18 (22 @ 09:00) (18 - 18)  SpO2: 100% (22 @ 09:00) (96% - 100%)  Patient On (Oxygen Delivery Method): room air        PHYSICAL EXAM:    Constitutional: NAD, awake and alert,   HEENT:  EOMI,  MMM  Neck: Soft and supple  Respiratory: Breath sounds are clear bilaterally, No wheezing, rales or rhonchi; Normal respiratory effort  Cardiovascular: S1 and S2, regular rate and rhythm, no Murmurs, gallops or rubs  Gastrointestinal: Bowel Sounds present, soft, nontender, nondistended, no guarding, no rebound  Extremities: No peripheral edema  : Penile swelling, erythema, woodson in place  Neurological: A/O x 1, no focal deficits in my limited exam                   9.1    7.30  )-----------( 208      ( 01 Dec 2022 03:10 )             27.7     12    138  |  106  |  30<H>  ----------------------------<  101<H>  3.8   |  28  |  0.95    Ca    9.4      01 Dec 2022 03:10    TPro  6.9  /  Alb  2.8<L>  /  TBili  0.3  /  DBili  x   /  AST  9<L>  /  ALT  18  /  AlkPhos  72  12-    CAPILLARY BLOOD GLUCOSE      POCT Blood Glucose.: 112 mg/dL (02 Dec 2022 08:27)  POCT Blood Glucose.: 118 mg/dL (01 Dec 2022 22:12)  POCT Blood Glucose.: 127 mg/dL (01 Dec 2022 17:20)    LIVER FUNCTIONS - ( 01 Dec 2022 03:10 )  Alb: 2.8 g/dL / Pro: 6.9 gm/dL / ALK PHOS: 72 U/L / ALT: 18 U/L / AST: 9 U/L / GGT: x             Urinalysis Basic - ( 01 Dec 2022 05:24 )    Color: Yellow / Appearance: Clear / S.005 / pH: x  Gluc: x / Ketone: Negative  / Bili: Negative / Urobili: Negative   Blood: x / Protein: 100 / Nitrite: Negative   Leuk Esterase: Moderate / RBC: 25-50 /HPF / WBC 11-25   Sq Epi: x / Non Sq Epi: Occasional / Bacteria: Few      < from: CT Abdomen and Pelvis w/ IV Cont (22 @ 03:01) >  IMPRESSION:    Severe right hydronephrosis to the level of the right ureteral pelvic   junction. Delayed right nephrogram. No radiopaque stones seen. This is   similar to the previous exam.    No evidence for Rhiannon's gangrene on this examination.    < end of copied text >  < from: CT Head No Cont (10.14.22 @ 19:27) >  IMPRESSION:    Cervical spine CT: No acute fractures or dislocations.    Similar-appearing multilevel cervical spondylosis.    Head CT: No acute intracranial hemorrhage,mass effect, or shift of the   midline structures.    < end of copied text >    REviewed labs, imaging, orders and vitals.      MEDICATIONS  (STANDING):  aspirin enteric coated 81 milliGRAM(s) Oral daily  cefepime   IVPB 2000 milliGRAM(s) IV Intermittent every 12 hours  clotrimazole/betamethasone Cream 1 Application(s) Topical two times a day  finasteride 5 milliGRAM(s) Oral daily  latanoprost 0.005% Ophthalmic Solution 1 Drop(s) Both EYES at bedtime  levothyroxine 50 MICROGram(s) Oral daily  lidocaine 2% Jelly 5 milliLiter(s) IntraUrethral once  tamsulosin 0.4 milliGRAM(s) Oral at bedtime    MEDICATIONS  (PRN):  acetaminophen     Tablet .. 650 milliGRAM(s) Oral every 6 hours PRN Temp greater or equal to 38C (100.4F), Mild Pain (1 - 3)  aluminum hydroxide/magnesium hydroxide/simethicone Suspension 30 milliLiter(s) Oral every 4 hours PRN Dyspepsia  melatonin 3 milliGRAM(s) Oral at bedtime PRN Insomnia  morphine  - Injectable 2 milliGRAM(s) IV Push every 4 hours PRN Severe Pain (7 - 10)  ondansetron Injectable 4 milliGRAM(s) IV Push every 8 hours PRN Nausea and/or Vomiting      Assessment/Plan:  77 year old man with penile infection / UTI:      Penile infection / cellulitis / balanitis / UTI / right hydronephrosis:  -CT noted  -chronic woodson replaced in ED  -c/w cefepime dose adjusted per ID  -BCx no growth x 2  -urology following  -supportive care      Anemia: Stable  -no active GI bleeding, Guaiac -neg  -may be AOCD  -may do more work up as outpatient if needed    DM. Controlled  A1c 6.2%,  BGM less than 150. DIscontinue BGM/ISS      SCD for DVT ppx; Transition to pharmacologic if hgb remains stable on       Code status: Full code.       dispo:  -d/c to [facility] once medically ready

## 2022-12-03 NOTE — PROGRESS NOTE ADULT - ASSESSMENT
Patient not sure if circumcised or not. On chart review pt required paraphimosis reduction earlier this year at [facility].     Patient was Morphine and 1 % Lidocaine as penile block.   Manual reduction of foreskin was performed with both thumbs over the Glans and both index and long fingers surrounding the trapped foreskin. Slow, steady pressure was applied to advance the phimotic portion of the foreskin outwards slowly, back over the Glans and was able to reduce the foreskin.   Had some bleeding from Glans during the reduction.   Maintain local hygiene.     Will get renogram before discharge.

## 2022-12-04 LAB
ANION GAP SERPL CALC-SCNC: 4 MMOL/L — LOW (ref 5–17)
BUN SERPL-MCNC: 28 MG/DL — HIGH (ref 7–23)
CALCIUM SERPL-MCNC: 8.5 MG/DL — SIGNIFICANT CHANGE UP (ref 8.5–10.1)
CHLORIDE SERPL-SCNC: 106 MMOL/L — SIGNIFICANT CHANGE UP (ref 96–108)
CO2 SERPL-SCNC: 26 MMOL/L — SIGNIFICANT CHANGE UP (ref 22–31)
CREAT SERPL-MCNC: 1.12 MG/DL — SIGNIFICANT CHANGE UP (ref 0.5–1.3)
EGFR: 67 ML/MIN/1.73M2 — SIGNIFICANT CHANGE UP
FERRITIN SERPL-MCNC: 156 NG/ML — SIGNIFICANT CHANGE UP (ref 30–400)
FOLATE SERPL-MCNC: 11.4 NG/ML — SIGNIFICANT CHANGE UP
GLUCOSE SERPL-MCNC: 103 MG/DL — HIGH (ref 70–99)
HCT VFR BLD CALC: 28.4 % — LOW (ref 39–50)
HGB BLD-MCNC: 9.1 G/DL — LOW (ref 13–17)
IRON SATN MFR SERPL: 10 % — LOW (ref 16–55)
IRON SATN MFR SERPL: 21 UG/DL — LOW (ref 45–165)
MAGNESIUM SERPL-MCNC: 1.9 MG/DL — SIGNIFICANT CHANGE UP (ref 1.6–2.6)
PHOSPHATE SERPL-MCNC: 3.5 MG/DL — SIGNIFICANT CHANGE UP (ref 2.5–4.5)
POTASSIUM SERPL-MCNC: 3.8 MMOL/L — SIGNIFICANT CHANGE UP (ref 3.5–5.3)
POTASSIUM SERPL-SCNC: 3.8 MMOL/L — SIGNIFICANT CHANGE UP (ref 3.5–5.3)
SODIUM SERPL-SCNC: 136 MMOL/L — SIGNIFICANT CHANGE UP (ref 135–145)
TIBC SERPL-MCNC: 205 UG/DL — LOW (ref 220–430)
UIBC SERPL-MCNC: 185 UG/DL — SIGNIFICANT CHANGE UP (ref 110–370)
VIT B12 SERPL-MCNC: 362 PG/ML — SIGNIFICANT CHANGE UP (ref 232–1245)

## 2022-12-04 PROCEDURE — 99232 SBSQ HOSP IP/OBS MODERATE 35: CPT

## 2022-12-04 RX ADMIN — FINASTERIDE 5 MILLIGRAM(S): 5 TABLET, FILM COATED ORAL at 09:23

## 2022-12-04 RX ADMIN — TAMSULOSIN HYDROCHLORIDE 0.4 MILLIGRAM(S): 0.4 CAPSULE ORAL at 21:36

## 2022-12-04 RX ADMIN — Medication 50 MICROGRAM(S): at 05:58

## 2022-12-04 RX ADMIN — LATANOPROST 1 DROP(S): 0.05 SOLUTION/ DROPS OPHTHALMIC; TOPICAL at 21:35

## 2022-12-04 RX ADMIN — CLOTRIMAZOLE AND BETAMETHASONE DIPROPIONATE 1 APPLICATION(S): 10; .5 CREAM TOPICAL at 17:47

## 2022-12-04 RX ADMIN — Medication 81 MILLIGRAM(S): at 09:23

## 2022-12-04 RX ADMIN — CLOTRIMAZOLE AND BETAMETHASONE DIPROPIONATE 1 APPLICATION(S): 10; .5 CREAM TOPICAL at 05:58

## 2022-12-04 RX ADMIN — CEFEPIME 100 MILLIGRAM(S): 1 INJECTION, POWDER, FOR SOLUTION INTRAMUSCULAR; INTRAVENOUS at 09:22

## 2022-12-04 RX ADMIN — CEFEPIME 100 MILLIGRAM(S): 1 INJECTION, POWDER, FOR SOLUTION INTRAMUSCULAR; INTRAVENOUS at 21:36

## 2022-12-04 NOTE — PROGRESS NOTE ADULT - SUBJECTIVE AND OBJECTIVE BOX
Patient is a 77y old  Male who presents with a chief complaint of Penile swelling and infection (04 Dec 2022 09:17)      Vital Signs Last 24 Hrs  T(C): 37.2 (04 Dec 2022 17:16), Max: 37.3 (03 Dec 2022 23:22)  T(F): 99 (04 Dec 2022 17:16), Max: 99.1 (03 Dec 2022 23:22)  HR: 59 (04 Dec 2022 17:16) (59 - 69)  BP: 101/59 (04 Dec 2022 17:16) (97/69 - 102/57)  BP(mean): --  RR: 18 (04 Dec 2022 17:16) (18 - 18)  SpO2: 98% (04 Dec 2022 17:16) (97% - 98%)    Parameters below as of 04 Dec 2022 17:16  Patient On (Oxygen Delivery Method): room air        LABS:                        9.1    x     )-----------( x        ( 04 Dec 2022 08:17 )             28.4     12-04    136  |  106  |  28<H>  ----------------------------<  103<H>  3.8   |  26  |  1.12    Ca    8.5      04 Dec 2022 08:17  Phos  3.5     12-04  Mg     1.9     12-04                   Patient is a 77y old  Male who presents with a chief complaint of Penile swelling and infection (04 Dec 2022 09:17)    No longer has penile pain.    Vital Signs Last 24 Hrs  T(C): 37.2 (04 Dec 2022 17:16), Max: 37.3 (03 Dec 2022 23:22)  T(F): 99 (04 Dec 2022 17:16), Max: 99.1 (03 Dec 2022 23:22)  HR: 59 (04 Dec 2022 17:16) (59 - 69)  BP: 101/59 (04 Dec 2022 17:16) (97/69 - 102/57)  BP(mean): --  RR: 18 (04 Dec 2022 17:16) (18 - 18)  SpO2: 98% (04 Dec 2022 17:16) (97% - 98%)    Parameters below as of 04 Dec 2022 17:16  Patient On (Oxygen Delivery Method): room air  AAO x 3   Normal respiratory effort  Normal peripheral circulation  Abdomen- Soft, ND, NT   : foreskin on Glans, improving erythema and edema   Hill in place draining clear urine with purulent discharge at the tip         LABS:                        9.1    x     )-----------( x        ( 04 Dec 2022 08:17 )             28.4     12-04    136  |  106  |  28<H>  ----------------------------<  103<H>  3.8   |  26  |  1.12    Ca    8.5      04 Dec 2022 08:17  Phos  3.5     12-04  Mg     1.9     12-04                 none

## 2022-12-04 NOTE — PROGRESS NOTE ADULT - ASSESSMENT
No longer has pain  Continue Lotrisone to foreskin.  Continue indwelling Hill catheter.   Renogram pending.

## 2022-12-04 NOTE — PROGRESS NOTE ADULT - SUBJECTIVE AND OBJECTIVE BOX
CC: Penile swelling  History of Present Illness:   77-year-old Male with history of dementia presented with penile swelling, redness and pain. Patient with dementia unable to contribute to HPI.  Per nursing home staff they stated he had some swelling in penile area since  which had only been worsening over the past few days. No fever. Woodson last changed on  as well.  Patient does not recall if he is circumcised or not. No other complain.       s:  : In bed, comfortable, confused at baseline.    : No new events, lying in bed, comfortable.  Woodson in place.  12/3: Limited ROS/Subjective due to baseline dementia. Pain present at penis. Controlled intermittently. Denies fever, chills, chest pain, SOB, nausea, vomiting, abdominal pain/discomfort.   : Pain present and controlled. Limited ROS/CC in the setting of baseline dementia. s/p bedside manual reduction of foreskin (12/3). Maintaining local lidocaine. Urology on board. Hemodynamically stable.     REVIEW OF SYSTEMS: All other review of systems is negative unless indicated above.    Physical Exam:   T(C): 37.3 (22 @ 23:22), Max: 37.3 (22 @ 23:22)  HR: 69 (22 @ 23:22) (69 - 72)  BP: 97/69 (22 @ 23:22) (97/69 - 112/57)  RR: 18 (22 @ 23:22) (18 - 18)  SpO2: 97% (22 @ 23:22) (97% - 98%)  Patient On (Oxygen Delivery Method): room air        PHYSICAL EXAM:    Constitutional: NAD, awake and alert,   HEENT:  EOMI,  MMM  Neck: Soft and supple  Respiratory: Breath sounds are clear bilaterally, No wheezing, rales or rhonchi; Normal respiratory effort  Cardiovascular: S1 and S2, regular rate and rhythm, no Murmurs, gallops or rubs  Gastrointestinal: Bowel Sounds present, soft, nontender, nondistended, no guarding, no rebound  Extremities: No peripheral edema  : Penile swelling, erythema, woodson in place  Neurological: A/O x 1, no focal deficits in my limited exam                            9.1    x     )-----------( x        ( 04 Dec 2022 08:17 )             28.4     12-04    136  |  106  |  28<H>  ----------------------------<  103<H>  3.8   |  26  |  1.12    Ca    8.5      04 Dec 2022 08:17      COVID-19 PCR: Detected (03 Aug 2022 21:00)  COVID-19 PCR: Detected (01 Aug 2022 12:14)  SARS-CoV-2: Detected (2022 20:00)    CAPILLARY BLOOD GLUCOSE                       9.1    7.30  )-----------( 208      ( 01 Dec 2022 03:10 )             27.7     12    138  |  106  |  30<H>  ----------------------------<  101<H>  3.8   |  28  |  0.95    Ca    9.4      01 Dec 2022 03:10    TPro  6.9  /  Alb  2.8<L>  /  TBili  0.3  /  DBili  x   /  AST  9<L>  /  ALT  18  /  AlkPhos  72  12-01    CAPILLARY BLOOD GLUCOSE      POCT Blood Glucose.: 112 mg/dL (02 Dec 2022 08:27)  POCT Blood Glucose.: 118 mg/dL (01 Dec 2022 22:12)  POCT Blood Glucose.: 127 mg/dL (01 Dec 2022 17:20)    LIVER FUNCTIONS - ( 01 Dec 2022 03:10 )  Alb: 2.8 g/dL / Pro: 6.9 gm/dL / ALK PHOS: 72 U/L / ALT: 18 U/L / AST: 9 U/L / GGT: x             Urinalysis Basic - ( 01 Dec 2022 05:24 )    Color: Yellow / Appearance: Clear / S.005 / pH: x  Gluc: x / Ketone: Negative  / Bili: Negative / Urobili: Negative   Blood: x / Protein: 100 / Nitrite: Negative   Leuk Esterase: Moderate / RBC: 25-50 /HPF / WBC 11-25   Sq Epi: x / Non Sq Epi: Occasional / Bacteria: Few      < from: CT Abdomen and Pelvis w/ IV Cont (22 @ 03:01) >  IMPRESSION:    Severe right hydronephrosis to the level of the right ureteral pelvic   junction. Delayed right nephrogram. No radiopaque stones seen. This is   similar to the previous exam.    No evidence for Rhiannon's gangrene on this examination.    < end of copied text >  < from: CT Head No Cont (10.14.22 @ 19:27) >  IMPRESSION:    Cervical spine CT: No acute fractures or dislocations.    Similar-appearing multilevel cervical spondylosis.    Head CT: No acute intracranial hemorrhage,mass effect, or shift of the   midline structures.    < end of copied text >    REviewed labs, imaging, orders and vitals.      MEDICATIONS  (STANDING):  aspirin enteric coated 81 milliGRAM(s) Oral daily  cefepime   IVPB 2000 milliGRAM(s) IV Intermittent every 12 hours  clotrimazole/betamethasone Cream 1 Application(s) Topical two times a day  finasteride 5 milliGRAM(s) Oral daily  latanoprost 0.005% Ophthalmic Solution 1 Drop(s) Both EYES at bedtime  levothyroxine 50 MICROGram(s) Oral daily  lidocaine 2% Jelly 5 milliLiter(s) IntraUrethral once  tamsulosin 0.4 milliGRAM(s) Oral at bedtime    MEDICATIONS  (PRN):  acetaminophen     Tablet .. 650 milliGRAM(s) Oral every 6 hours PRN Temp greater or equal to 38C (100.4F), Mild Pain (1 - 3)  aluminum hydroxide/magnesium hydroxide/simethicone Suspension 30 milliLiter(s) Oral every 4 hours PRN Dyspepsia  melatonin 3 milliGRAM(s) Oral at bedtime PRN Insomnia  morphine  - Injectable 2 milliGRAM(s) IV Push every 4 hours PRN Severe Pain (7 - 10)  ondansetron Injectable 4 milliGRAM(s) IV Push every 8 hours PRN Nausea and/or Vomiting      Assessment/Plan:  77 year old man with penile infection / UTI:      Penile infection / cellulitis / balanitis / UTI / right hydronephrosis:  -CT noted  -chronic woodson replaced in ED  -c/w cefepime dose adjusted per ID  -BCx no growth x 2  -urology following  -supportive care  -BPH-continue home medi    Anemia: Stable  -no active GI bleeding, Guaiac -neg  -may be AOCD  -Ferritin/Iron/B12/Folate ordered    DM. Controlled  -A1c 6.2%,  -BGM less than 150. Discontinue BGM/ISS    Hypothyroidism;  -Continue levothyroxine      DVT ppx; SCDs to continue as there was some bleeding with manual reduction of the foreskin on . Pharmacological      Code status: Full code.   Discussed status and plan of care with niece Cathryn Sampson (RN) @ [phone] on . Agreeable with plan of care  HCP nephew Siddhartha Sampson [phone] updated   Also initiated [facility] discussion. Limited information at this time. Niece (Cathryn) and Nephew/HCP (Siddhartha) to see if any advanced directives/living will available and if not they will discuss amongst themselves aspects of care like code status, etc. Ongoing discussion.       dispo:  -d/c to [facility] once medically ready

## 2022-12-05 LAB
ANION GAP SERPL CALC-SCNC: 2 MMOL/L — LOW (ref 5–17)
BUN SERPL-MCNC: 31 MG/DL — HIGH (ref 7–23)
CALCIUM SERPL-MCNC: 8.7 MG/DL — SIGNIFICANT CHANGE UP (ref 8.5–10.1)
CHLORIDE SERPL-SCNC: 106 MMOL/L — SIGNIFICANT CHANGE UP (ref 96–108)
CO2 SERPL-SCNC: 27 MMOL/L — SIGNIFICANT CHANGE UP (ref 22–31)
CREAT SERPL-MCNC: 1.09 MG/DL — SIGNIFICANT CHANGE UP (ref 0.5–1.3)
EGFR: 69 ML/MIN/1.73M2 — SIGNIFICANT CHANGE UP
GLUCOSE SERPL-MCNC: 107 MG/DL — HIGH (ref 70–99)
HCT VFR BLD CALC: 28.3 % — LOW (ref 39–50)
HGB BLD-MCNC: 9.1 G/DL — LOW (ref 13–17)
POTASSIUM SERPL-MCNC: 3.8 MMOL/L — SIGNIFICANT CHANGE UP (ref 3.5–5.3)
POTASSIUM SERPL-SCNC: 3.8 MMOL/L — SIGNIFICANT CHANGE UP (ref 3.5–5.3)
SODIUM SERPL-SCNC: 135 MMOL/L — SIGNIFICANT CHANGE UP (ref 135–145)

## 2022-12-05 PROCEDURE — 99232 SBSQ HOSP IP/OBS MODERATE 35: CPT

## 2022-12-05 PROCEDURE — 78708 K FLOW/FUNCT IMAGE W/DRUG: CPT | Mod: 26

## 2022-12-05 RX ORDER — FUROSEMIDE 40 MG
40 TABLET ORAL ONCE
Refills: 0 | Status: COMPLETED | OUTPATIENT
Start: 2022-12-05 | End: 2022-12-05

## 2022-12-05 RX ORDER — SODIUM CHLORIDE 9 MG/ML
635 INJECTION INTRAMUSCULAR; INTRAVENOUS; SUBCUTANEOUS ONCE
Refills: 0 | Status: COMPLETED | OUTPATIENT
Start: 2022-12-05 | End: 2022-12-05

## 2022-12-05 RX ADMIN — SODIUM CHLORIDE 635 MILLILITER(S): 9 INJECTION INTRAMUSCULAR; INTRAVENOUS; SUBCUTANEOUS at 10:16

## 2022-12-05 RX ADMIN — TAMSULOSIN HYDROCHLORIDE 0.4 MILLIGRAM(S): 0.4 CAPSULE ORAL at 21:08

## 2022-12-05 RX ADMIN — CEFEPIME 100 MILLIGRAM(S): 1 INJECTION, POWDER, FOR SOLUTION INTRAMUSCULAR; INTRAVENOUS at 23:42

## 2022-12-05 RX ADMIN — Medication 40 MILLIGRAM(S): at 12:43

## 2022-12-05 RX ADMIN — CLOTRIMAZOLE AND BETAMETHASONE DIPROPIONATE 1 APPLICATION(S): 10; .5 CREAM TOPICAL at 06:07

## 2022-12-05 RX ADMIN — LATANOPROST 1 DROP(S): 0.05 SOLUTION/ DROPS OPHTHALMIC; TOPICAL at 21:09

## 2022-12-05 RX ADMIN — Medication 81 MILLIGRAM(S): at 10:47

## 2022-12-05 RX ADMIN — Medication 50 MICROGRAM(S): at 06:07

## 2022-12-05 RX ADMIN — CLOTRIMAZOLE AND BETAMETHASONE DIPROPIONATE 1 APPLICATION(S): 10; .5 CREAM TOPICAL at 17:00

## 2022-12-05 RX ADMIN — FINASTERIDE 5 MILLIGRAM(S): 5 TABLET, FILM COATED ORAL at 10:47

## 2022-12-05 RX ADMIN — Medication 3 MILLIGRAM(S): at 21:08

## 2022-12-05 RX ADMIN — CEFEPIME 100 MILLIGRAM(S): 1 INJECTION, POWDER, FOR SOLUTION INTRAMUSCULAR; INTRAVENOUS at 10:47

## 2022-12-05 NOTE — PROGRESS NOTE ADULT - ASSESSMENT
Continue Lotrisone to foreskin.  Continue indwelling Hill catheter.     Follow up with Dr Chowdary as out patient for Pyeloplasty.

## 2022-12-05 NOTE — PROGRESS NOTE ADULT - SUBJECTIVE AND OBJECTIVE BOX
CC: Penile swelling  History of Present Illness:   77-year-old Male with history of dementia presented with penile swelling, redness and pain. Patient with dementia unable to contribute to HPI.  Per nursing home staff they stated he had some swelling in penile area since November 27 which had only been worsening over the past few days. No fever. Hill last changed on 27 November as well.  Patient does not recall if he is circumcised or not. No other complain.     s:  12/1: In bed, comfortable, confused at baseline.    12/2: No new events, lying in bed, comfortable.  Hill in place.  12/3: Limited ROS/Subjective due to baseline dementia. Pain present at penis. Controlled intermittently. Denies fever, chills, chest pain, SOB, nausea, vomiting, abdominal pain/discomfort.   12/4: Pain present and controlled. Limited ROS/CC in the setting of baseline dementia. s/p bedside manual reduction of foreskin (12/3). Maintaining local lidocaine. Urology on board. Hemodynamically stable.     12/5: no complaints, dementia at baseline  renal scan today, obstructed right kidney      REVIEW OF SYSTEMS: All other review of systems is negative unless indicated above.        PHYSICAL EXAM:    Daily     Vital Signs Last 24 Hrs  T(C): 36.8 (05 Dec 2022 09:05), Max: 37.5 (05 Dec 2022 00:19)  T(F): 98.3 (05 Dec 2022 09:05), Max: 99.5 (05 Dec 2022 00:19)  HR: 74 (05 Dec 2022 10:16) (59 - 74)  BP: 103/44 (05 Dec 2022 10:16) (101/59 - 118/68)  BP(mean): --  RR: 18 (05 Dec 2022 10:16) (18 - 18)  SpO2: 99% (05 Dec 2022 10:16) (95% - 99%)    Constitutional: Weak  appearing  HEENT: Atraumatic, MICKIE, Normal, No congestion  Respiratory: Breath Sounds normal, no rhonchi/wheeze  Cardiovascular: N S1S2;   Gastrointestinal: Abdomen soft, non tender, Bowel Sounds present  Extremities: No edema, peripheral pulses present  Neurological: AAO x 1, no gross focal motor deficits  Skin: Non cellulitic, no rash, ulcers  Lymph Nodes: No lymphadenopathy noted  Back: No CVA tenderness   Musculoskeletal: non tender  Breasts: Deferred  Genitourinary: deferred  Rectal: Deferred    All Labs/EKG/Radiology/Meds reviewed    Lab Results:  CBC  CBC Full  -  ( 05 Dec 2022 07:06 )  WBC Count : x  RBC Count : x  Hemoglobin : 9.1 g/dL  Hematocrit : 28.3 %  Platelet Count - Automated : x  Mean Cell Volume : x  Mean Cell Hemoglobin : x  Mean Cell Hemoglobin Concentration : x  Auto Neutrophil # : x  Auto Lymphocyte # : x  Auto Monocyte # : x  Auto Eosinophil # : x  Auto Basophil # : x  Auto Neutrophil % : x  Auto Lymphocyte % : x  Auto Monocyte % : x  Auto Eosinophil % : x  Auto Basophil % : x    .		Differential:	[] Automated		[] Manual  Chemistry  12-05    135  |  106  |  31<H>  ----------------------------<  107<H>  3.8   |  27  |  1.09    Ca    8.7      05 Dec 2022 07:06  Phos  3.5     12-04  Mg     1.9     12-04 12-04-22 @ 07:01  -  12-05-22 @ 07:00  --------------------------------------------------------  IN: 0 mL / OUT: 950 mL / NET: -950 mL    12-05-22 @ 07:01  -  12-05-22 @ 15:15  --------------------------------------------------------  IN: 0 mL / OUT: 825 mL / NET: -825 mL      MICROBIOLOGY/CULTURES:  Culture Results:   >=3 organisms. Probable collection contamination. (12-01 @ 05:24)  Culture Results:   No growth to date. (12-01 @ 03:10)  Culture Results:   No growth to date. (12-01 @ 03:10)        rad< from: NM Renal/Lasix (NM Renal/Lasix .) (12.05.22 @ 13:25) >  IMPRESSION: Abnormal diuretic renal scan.    Obstructed right kidney with markedly diminished flow and function    Good flow to and function of the left kidney with no evidence of   obstruction.    Differential renal function: Right kidney 18.2 %; left kidney 81.8 %.      < end of copied text >        < from: CT Abdomen and Pelvis w/ IV Cont (12.01.22 @ 03:01) >  IMPRESSION:    Severe right hydronephrosis to the level of the right ureteral pelvic   junction. Delayed right nephrogram. No radiopaque stones seen. This is   similar to the previous exam.    No evidence for Rhiannon's gangrene on this examination.    < end of copied text >  < from: CT Head No Cont (10.14.22 @ 19:27) >  IMPRESSION:    Cervical spine CT: No acute fractures or dislocations.    Similar-appearing multilevel cervical spondylosis.    Head CT: No acute intracranial hemorrhage,mass effect, or shift of the   midline structures.    < end of copied text >    REviewed labs, imaging, orders and vitals.      MEDICATIONS  (STANDING):  aspirin enteric coated 81 milliGRAM(s) Oral daily  cefepime   IVPB 2000 milliGRAM(s) IV Intermittent every 12 hours  clotrimazole/betamethasone Cream 1 Application(s) Topical two times a day  finasteride 5 milliGRAM(s) Oral daily  latanoprost 0.005% Ophthalmic Solution 1 Drop(s) Both EYES at bedtime  levothyroxine 50 MICROGram(s) Oral daily  lidocaine 2% Jelly 5 milliLiter(s) IntraUrethral once  tamsulosin 0.4 milliGRAM(s) Oral at bedtime    MEDICATIONS  (PRN):  acetaminophen     Tablet .. 650 milliGRAM(s) Oral every 6 hours PRN Temp greater or equal to 38C (100.4F), Mild Pain (1 - 3)  aluminum hydroxide/magnesium hydroxide/simethicone Suspension 30 milliLiter(s) Oral every 4 hours PRN Dyspepsia  melatonin 3 milliGRAM(s) Oral at bedtime PRN Insomnia  morphine  - Injectable 2 milliGRAM(s) IV Push every 4 hours PRN Severe Pain (7 - 10)  ondansetron Injectable 4 milliGRAM(s) IV Push every 8 hours PRN Nausea and/or Vomiting      DVT ppx; SCDs to continue as there was some bleeding with manual reduction of the foreskin on 12/4. Pharmacological      Code status: Full code.   Discussed status and plan of care with niece Cathryn Sampson (RN) @ [phone] on 12/4. Agreeable with plan of care  HCP nephew Siddhartha Camilla [phone] updated 12/4  Also initiated C discussion. Limited information at this time. Niece (Cathryn) and Nephew/HCP (Siddhartha) to see if any advanced directives/living will available and if not they will discuss amongst themselves aspects of care like code status, etc. Ongoing discussion.

## 2022-12-05 NOTE — PROGRESS NOTE ADULT - ASSESSMENT
77-year-old Male with history of dementia presented with penile swelling, redness and pain. Patient with dementia unable to contribute to HPI.  Per nursing home staff they stated he had some swelling in penile area since November 27 which had only been worsening over the past few days. No fever. Hill last changed on 27 November as well.  Patient does not recall if he is circumcised or not. Here Ua with pyuria, Ct abd/pelvis with severe R hydronephrosis to the level of the R ureteral pelvic junction, delayed R nephrogram, no stones, was given IV cefepime.     1. Pyuria. Complicated UTI. R hydronephrosis. ? R pyelonephritis.  infection  - urology eval appreciated  - on IV cefepime [id number] #5  - continue with antibiotic coverage  - tolerating abx well so far; no side effects noted  - reason for abx use and side effects reviewed with patient  - f/u urine cx - contaminated, blood cx - no growth   - on dc po ceftin 500mg BID x 10 days   - f/u cbc  - supportive care    2. other issues - care per medicine

## 2022-12-05 NOTE — PROGRESS NOTE ADULT - SUBJECTIVE AND OBJECTIVE BOX
Patient is advised that Dr. Waggoner will not prescribe Lorazepam but she would be ok with prescribing Wellbutrin. Patient would like to proceed with Wellbutrin. Please advise on dosing and quantity.     Patient is a 77y old  Male who presents with a chief complaint of Penile swelling and infection (05 Dec 2022 10:36)      Vital Signs Last 24 Hrs  T(C): 36.8 (05 Dec 2022 09:05), Max: 37.5 (05 Dec 2022 00:19)  T(F): 98.3 (05 Dec 2022 09:05), Max: 99.5 (05 Dec 2022 00:19)  HR: 74 (05 Dec 2022 10:16) (59 - 74)  BP: 103/44 (05 Dec 2022 10:16) (101/59 - 118/68)  BP(mean): --  RR: 18 (05 Dec 2022 10:16) (18 - 18)  SpO2: 99% (05 Dec 2022 10:16) (95% - 99%)    Parameters below as of 05 Dec 2022 10:16  Patient On (Oxygen Delivery Method): room air        LABS:                        9.1    x     )-----------( x        ( 05 Dec 2022 07:06 )             28.3     12-05    135  |  106  |  31<H>  ----------------------------<  107<H>  3.8   |  27  |  1.09    Ca    8.7      05 Dec 2022 07:06  Phos  3.5     12-04  Mg     1.9     12-04      < from: NM Renal/Lasix (NM Renal/Lasix .) (12.05.22 @ 13:25) >  CC: [id number] EXAM:  NM KIDNEY IMG LASIX                          PROCEDURE DATE:  12/05/2022          INTERPRETATION:  RADIOPHARMACEUTICAL: 11.4 mCi   [id number] (MAG3), I.V.    CLINICAL STATEMENT: 77-year-old male with right hydronephrosis    TECHNIQUE: Patient was brought to nuclear medicine with indwelling Hill   catheter in place. Patient was hydrated with 635 mL normal saline   solution and 1 hour intravenously. Dynamic images of the posterior   abdomen were obtained for 45 minutes following administration of   radiopharmaceutical.  Lasix 40 mg I.V. was administered at 22 minutes   post-injection.    FINDINGS:  The renal perfusion images demonstrate good flow to the left   kidney and markedly delayed and diminished flow to the right kidney.    The functional images show good cortical uptake of radiotracer by the   left kidney and markedly diminished uptake by remaining functioning right   renal cortex. There is prompt appearance of activity in the left   collecting system by 4 minutes.    There is near total drainage of activity from the left collecting system   prior to Lasix followed by complete washout after Lasix.    There is continued accumulation of activity in the right renal cortex   without evidence of cortical transit or drainage from the right   collecting system before or after the administration of Lasix. Photopenic   area in the medial aspect of the right kidney likely corresponds to   hydronephrosis.    Differential renal function: Rightkidney 18.2 %; left kidney 81.8 %.    IMPRESSION: Abnormal diuretic renal scan.    Obstructed right kidney with markedly diminished flow and function    Good flow to and function of the left kidney with no evidence of   obstruction.    Differential renal function: Right kidney 18.2 %; left kidney 81.8 %.    < end of copied text >               Patient is a 77y old  Male who presents with a chief complaint of Penile swelling and infection (05 Dec 2022 10:36)      Vital Signs Last 24 Hrs  T(C): 36.8 (05 Dec 2022 09:05), Max: 37.5 (05 Dec 2022 00:19)  T(F): 98.3 (05 Dec 2022 09:05), Max: 99.5 (05 Dec 2022 00:19)  HR: 74 (05 Dec 2022 10:16) (59 - 74)  BP: 103/44 (05 Dec 2022 10:16) (101/59 - 118/68)  BP(mean): --  RR: 18 (05 Dec 2022 10:16) (18 - 18)  SpO2: 99% (05 Dec 2022 10:16) (95% - 99%)    Parameters below as of 05 Dec 2022 10:16  Patient On (Oxygen Delivery Method): room air  AAO x 3   Normal respiratory effort  Normal peripheral circulation  Abdomen- Soft, ND, NT   : foreskin on Glans, improving erythema and edema   Hill in place draining clear urine with purulent discharge at the tip         LABS:                        9.1    x     )-----------( x        ( 05 Dec 2022 07:06 )             28.3     12-05    135  |  106  |  31<H>  ----------------------------<  107<H>  3.8   |  27  |  1.09    Ca    8.7      05 Dec 2022 07:06  Phos  3.5     12-04  Mg     1.9     12-04      < from: NM Renal/Lasix (NM Renal/Lasix .) (12.05.22 @ 13:25) >  CC: [id number] EXAM:  NM KIDNEY IMG LASIX                          PROCEDURE DATE:  12/05/2022          INTERPRETATION:  RADIOPHARMACEUTICAL: 11.4 mCi   [id number] (MAG3), I.V.    CLINICAL STATEMENT: 77-year-old male with right hydronephrosis    TECHNIQUE: Patient was brought to nuclear medicine with indwelling Hill   catheter in place. Patient was hydrated with 635 mL normal saline   solution and 1 hour intravenously. Dynamic images of the posterior   abdomen were obtained for 45 minutes following administration of   radiopharmaceutical.  Lasix 40 mg I.V. was administered at 22 minutes   post-injection.    FINDINGS:  The renal perfusion images demonstrate good flow to the left   kidney and markedly delayed and diminished flow to the right kidney.    The functional images show good cortical uptake of radiotracer by the   left kidney and markedly diminished uptake by remaining functioning right   renal cortex. There is prompt appearance of activity in the left   collecting system by 4 minutes.    There is near total drainage of activity from the left collecting system   prior to Lasix followed by complete washout after Lasix.    There is continued accumulation of activity in the right renal cortex   without evidence of cortical transit or drainage from the right   collecting system before or after the administration of Lasix. Photopenic   area in the medial aspect of the right kidney likely corresponds to   hydronephrosis.    Differential renal function: Rightkidney 18.2 %; left kidney 81.8 %.    IMPRESSION: Abnormal diuretic renal scan.    Obstructed right kidney with markedly diminished flow and function    Good flow to and function of the left kidney with no evidence of   obstruction.    Differential renal function: Right kidney 18.2 %; left kidney 81.8 %.    < end of copied text >

## 2022-12-05 NOTE — PROGRESS NOTE ADULT - SUBJECTIVE AND OBJECTIVE BOX
Date of service: 22 @ 10:36    pt seen and examined  laying in bed, nad  no fevers   feels better  has wodoson in place     ROS: no fever or chills; denies dizziness, no HA, no SOB or cough, no abdominal pain, no diarrhea or constipation;  no legs pain, no rashes    MEDICATIONS  (STANDING):  aspirin enteric coated 81 milliGRAM(s) Oral daily  cefepime   IVPB 2000 milliGRAM(s) IV Intermittent every 12 hours  clotrimazole/betamethasone Cream 1 Application(s) Topical two times a day  finasteride 5 milliGRAM(s) Oral daily  furosemide   Injectable 40 milliGRAM(s) IV Push once  latanoprost 0.005% Ophthalmic Solution 1 Drop(s) Both EYES at bedtime  levothyroxine 50 MICROGram(s) Oral daily  lidocaine 2% Jelly 5 milliLiter(s) IntraUrethral once  tamsulosin 0.4 milliGRAM(s) Oral at bedtime      Vital Signs Last 24 Hrs  T(C): 36.8 (05 Dec 2022 09:05), Max: 37.5 (05 Dec 2022 00:19)  T(F): 98.3 (05 Dec 2022 09:05), Max: 99.5 (05 Dec 2022 00:19)  HR: 64 (05 Dec 2022 09:05) (59 - 67)  BP: 118/68 (05 Dec 2022 09:05) (101/59 - 118/68)  BP(mean): --  RR: 18 (05 Dec 2022 09:05) (18 - 18)  SpO2: 99% (05 Dec 2022 09:05) (95% - 99%)    Parameters below as of 05 Dec 2022 09:05  Patient On (Oxygen Delivery Method): room air    PE:  Constitutional: NAD  HEENT: NC/AT, EOMI, PERRLA, conjunctivae clear; ears and nose atraumatic; pharynx benign  Neck: supple; thyroid not palpable  Back: no tenderness  Respiratory: respiratory effort normal; clear to auscultation  Cardiovascular: S1S2 regular, no murmurs  Abdomen: soft, not tender, not distended, positive BS; liver and spleen WNL  Genitourinary: no suprapubic tenderness + resolving penile redness woodson in place   Lymphatic: no LN palpable  Musculoskeletal: no muscle tenderness, no joint swelling or tenderness  Extremities: no pedal edema  Neurological/ Psychiatric: AxOx3, Judgement and insight normal;  moving all extremities  Skin: no rashes; no palpable lesions    Labs: all available labs reviewed                        9.1    x     )-----------( x        ( 05 Dec 2022 07:06 )             28.3     12-05    135  |  106  |  31<H>  ----------------------------<  107<H>  3.8   |  27  |  1.09    Ca    8.7      05 Dec 2022 07:06  Phos  3.5     12-04  Mg     1.9     12-04          LIVER FUNCTIONS - ( 01 Dec 2022 03:10 )  Alb: 2.8 g/dL / Pro: 6.9 gm/dL / ALK PHOS: 72 U/L / ALT: 18 U/L / AST: 9 U/L / GGT: x           Urinalysis Basic - ( 01 Dec 2022 05:24 )    Color: Yellow / Appearance: Clear / S.005 / pH: x  Gluc: x / Ketone: Negative  / Bili: Negative / Urobili: Negative   Blood: x / Protein: 100 / Nitrite: Negative   Leuk Esterase: Moderate / RBC: 25-50 /HPF / WBC 11-25   Sq Epi: x / Non Sq Epi: Occasional / Bacteria: Few    Culture - Blood (22 @ 03:10)   Specimen Source: .Blood None   Culture Results:   No growth to date.   Culture - Blood (22 @ 03:10)   Specimen Source: .Blood None   Culture Results:   No growth to date.       Radiology: all available radiological tests reviewed  < from: CT Abdomen and Pelvis w/ IV Cont (22 @ 03:01) >  ACC: [id number] EXAM:  CT ABDOMEN AND PELVIS IC                          PROCEDURE DATE:  2022          INTERPRETATION:  CLINICAL INFORMATION: Pelvic pain question Rhiannon's   gangrene    COMPARISON: CT abdomen pelvis 2022.    CONTRAST/COMPLICATIONS:  IV Contrast: None  90 cc administered   10 cc discarded  Oral Contrast: None  Complications: None    PROCEDURE:  CT of the Abdomen and Pelvis was performed.  Sagittal and coronal reformats were performed.    FINDINGS:  LOWER CHEST: Motion limited evaluation of the visualized lungs. Probable   right basilar minimal associated minimal atelectatic changes.    LIVER: Within normal limits.  BILE DUCTS: Normal caliber.  GALLBLADDER: Within normal limits.  SPLEEN: Within normal limits.  PANCREAS: Within normal limits.  ADRENALS: Within normal limits.  KIDNEYS/URETERS: Severe right hydronephrosis to the level of the right   ureteral pelvic junction. Delayed right nephrogram. No radiopaque stones   seen. This is similar to the previous exam. Small left renal cysts. No   left hydronephrosis.    BLADDER: Urinary bladder is mostly collapsed around a Woodson catheter   limiting evaluation.  REPRODUCTIVE ORGANS: Prostate is enlarged.    BOWEL: Small hiatal hernia. No bowel obstruction. Appendix is not   visualized. No evidence of inflammation in the pericecal region.  PERITONEUM: No ascites.  VESSELS: Atherosclerotic changes.  RETROPERITONEUM/LYMPH NODES: No lymphadenopathy.  ABDOMINAL WALL: Within normal limits. No evidence for Rhiannon's gangrene   on this examination.  BONES: Degenerative changes. Scoliosis.    IMPRESSION:    Severe right hydronephrosis to the level of the right ureteral pelvic   junction. Delayed right nephrogram. No radiopaque stones seen. This is   similar to the previous exam.    No evidence for Rhiannon's gangrene on this examination.        < end of copied text >    Advanced directives addressed: full resuscitation

## 2022-12-05 NOTE — PROGRESS NOTE ADULT - PROBLEM/PLAN-5
Timothy Trent is a 14year old male who was brought in for this visit. History was provided by the CAREGIVER. HPI:   Patient presents with:   Well Child    School performance and activities: [employer]; playing baseball - club for now; grades are OK so
and rhythm are regular with no murmurs, gallups, or rubs; normal radial and femoral pulses  Abdomen: Soft, non-tender, non-distended; no organomegaly noted; no masses  Genitourinary: Normal male with testes descended bilaterally;  Marky stage 4  Skin/Hair:
DISPLAY PLAN FREE TEXT

## 2022-12-05 NOTE — PROGRESS NOTE ADULT - ASSESSMENT
77 year old man with penile infection / UTI:      Penile infection / cellulitis / balanitis / UTI / right hydronephrosis: abnormal Renal  Lasix scan, Right kidney obstructed; f/u with Dr. Chowdary as out pt for Pyeloplasty   -CT noted  -chronic woodson replaced in ED  -c/w cefepime dose adjusted per ID  -BCx no growth x 2  -urology following  -supportive care  -BPH-continue home medi    Anemia: Stable  -no active GI bleeding, Guaiac -neg  -may be AOCD  -Ferritin/Iron/B12/Folate ordered    DM. Controlled  -A1c 6.2%,  -BGM less than 150. Discontinue BGM/ISS    Hypothyroidism;  -Continue levothyroxine    dispo:  -d/c to [facility] once medically ready likely 12/6; BMP, cbc in am

## 2022-12-06 LAB
ANION GAP SERPL CALC-SCNC: 5 MMOL/L — SIGNIFICANT CHANGE UP (ref 5–17)
BUN SERPL-MCNC: 37 MG/DL — HIGH (ref 7–23)
CALCIUM SERPL-MCNC: 9.3 MG/DL — SIGNIFICANT CHANGE UP (ref 8.5–10.1)
CHLORIDE SERPL-SCNC: 104 MMOL/L — SIGNIFICANT CHANGE UP (ref 96–108)
CO2 SERPL-SCNC: 29 MMOL/L — SIGNIFICANT CHANGE UP (ref 22–31)
CREAT SERPL-MCNC: 1.35 MG/DL — HIGH (ref 0.5–1.3)
CULTURE RESULTS: SIGNIFICANT CHANGE UP
CULTURE RESULTS: SIGNIFICANT CHANGE UP
EGFR: 54 ML/MIN/1.73M2 — LOW
GLUCOSE SERPL-MCNC: 116 MG/DL — HIGH (ref 70–99)
HCT VFR BLD CALC: 31.3 % — LOW (ref 39–50)
HGB BLD-MCNC: 10.3 G/DL — LOW (ref 13–17)
MCHC RBC-ENTMCNC: 31.4 PG — SIGNIFICANT CHANGE UP (ref 27–34)
MCHC RBC-ENTMCNC: 32.9 GM/DL — SIGNIFICANT CHANGE UP (ref 32–36)
MCV RBC AUTO: 95.4 FL — SIGNIFICANT CHANGE UP (ref 80–100)
PLATELET # BLD AUTO: 232 K/UL — SIGNIFICANT CHANGE UP (ref 150–400)
POTASSIUM SERPL-MCNC: 4 MMOL/L — SIGNIFICANT CHANGE UP (ref 3.5–5.3)
POTASSIUM SERPL-SCNC: 4 MMOL/L — SIGNIFICANT CHANGE UP (ref 3.5–5.3)
RBC # BLD: 3.28 M/UL — LOW (ref 4.2–5.8)
RBC # FLD: 12.5 % — SIGNIFICANT CHANGE UP (ref 10.3–14.5)
SODIUM SERPL-SCNC: 138 MMOL/L — SIGNIFICANT CHANGE UP (ref 135–145)
SPECIMEN SOURCE: SIGNIFICANT CHANGE UP
SPECIMEN SOURCE: SIGNIFICANT CHANGE UP
WBC # BLD: 6.05 K/UL — SIGNIFICANT CHANGE UP (ref 3.8–10.5)
WBC # FLD AUTO: 6.05 K/UL — SIGNIFICANT CHANGE UP (ref 3.8–10.5)

## 2022-12-06 PROCEDURE — 99232 SBSQ HOSP IP/OBS MODERATE 35: CPT

## 2022-12-06 RX ORDER — SODIUM CHLORIDE 9 MG/ML
1000 INJECTION INTRAMUSCULAR; INTRAVENOUS; SUBCUTANEOUS
Refills: 0 | Status: DISCONTINUED | OUTPATIENT
Start: 2022-12-06 | End: 2022-12-08

## 2022-12-06 RX ADMIN — LATANOPROST 1 DROP(S): 0.05 SOLUTION/ DROPS OPHTHALMIC; TOPICAL at 21:08

## 2022-12-06 RX ADMIN — Medication 81 MILLIGRAM(S): at 10:45

## 2022-12-06 RX ADMIN — Medication 50 MICROGRAM(S): at 05:19

## 2022-12-06 RX ADMIN — CLOTRIMAZOLE AND BETAMETHASONE DIPROPIONATE 1 APPLICATION(S): 10; .5 CREAM TOPICAL at 17:45

## 2022-12-06 RX ADMIN — CEFEPIME 100 MILLIGRAM(S): 1 INJECTION, POWDER, FOR SOLUTION INTRAMUSCULAR; INTRAVENOUS at 10:45

## 2022-12-06 RX ADMIN — FINASTERIDE 5 MILLIGRAM(S): 5 TABLET, FILM COATED ORAL at 10:45

## 2022-12-06 RX ADMIN — TAMSULOSIN HYDROCHLORIDE 0.4 MILLIGRAM(S): 0.4 CAPSULE ORAL at 21:08

## 2022-12-06 RX ADMIN — Medication 3 MILLIGRAM(S): at 21:08

## 2022-12-06 RX ADMIN — CEFEPIME 100 MILLIGRAM(S): 1 INJECTION, POWDER, FOR SOLUTION INTRAMUSCULAR; INTRAVENOUS at 21:07

## 2022-12-06 RX ADMIN — CLOTRIMAZOLE AND BETAMETHASONE DIPROPIONATE 1 APPLICATION(S): 10; .5 CREAM TOPICAL at 05:19

## 2022-12-06 RX ADMIN — SODIUM CHLORIDE 75 MILLILITER(S): 9 INJECTION INTRAMUSCULAR; INTRAVENOUS; SUBCUTANEOUS at 15:36

## 2022-12-06 NOTE — PROGRESS NOTE ADULT - ASSESSMENT
77 year old man with penile infection / UTI:      Penile infection / cellulitis / balanitis / UTI / right hydronephrosis: abnormal Renal  Lasix scan, Right kidney obstructed; f/u with Dr. Chowdary as out pt for Pyeloplasty   -CT noted  -chronic woodson replaced in ED  -c/w cefepime dose adjusted per ID  -BCx no growth x 2  -urology following  -supportive care  -BPH-continue home medi    Anemia: Stable  -no active GI bleeding, Guaiac -neg  -may be AOCD  -Ferritin/Iron/B12/Folate ordered    DM. Controlled  -A1c 6.2%,  -BGM less than 150. Discontinue BGM/ISS    Hypothyroidism;  -Continue levothyroxine    dispo:  -d/c to [facility] once medically ready likely 12/6; BMP, cbc in am        DVT ppx; SCDs to continue as there was some bleeding with manual reduction of the foreskin on 12/4.       Code status: Full code.   Discussed status and plan of care with niece Cathryn Sampson (RN) @ [phone] on 12/4. Agreeable with plan of care  HCP nephew Siddhartha Camilla [phone] updated 12/4; 12/6: called and left a message.   Also initiated GOC discussion. Limited information at this time. Niece (Cathryn) and Nephew/HCP (Siddhartha) to see if any advanced directives/living will available and if not they will discuss amongst themselves aspects of care like code status, etc. Ongoing discussion.            77 year old man with penile infection / UTI:      Penile infection / cellulitis / balanitis / UTI / right hydronephrosis: abnormal Renal  Lasix scan, Right kidney obstructed; f/u with Dr. Chowdary as out pt for Pyeloplasty   -CT noted  -chronic woodson replaced in ED  -c/w cefepime dose adjusted per ID  -BCx no growth x 2  -urology following  -supportive care  -BPH-continue home medi    Anemia: Stable  -no active GI bleeding, Guaiac -neg  -may be AOCD  -Ferritin/Iron/B12/Folate ordered    Increased Cr + Soft BP 95/50: likely sec to lasix given for renal scan  start iv fluids  BMP in am  monitor BP    DM. Controlled  -A1c 6.2%,  -BGM less than 150. Discontinue BGM/ISS    Hypothyroidism;  -Continue levothyroxine    DVT ppx; SCDs to continue as there was some bleeding with manual reduction of the foreskin on 12/4.     dispo:  -d/c to [facility] once medically ready likely 12/7; BMP, cbc in am, monitor BP      Code status: Full code.   Discussed status and plan of care with niece Cathryn Sampson (RN) @ [phone] on 12/4. Agreeable with plan of care  HCP nephew Siddhartha Sampson [phone] updated 12/4; 12/6: called and left a message.   Also initiated GOC discussion. Limited information at this time. Niece (Cathryn) and Nephew/HCP (Siddhartha) to see if any advanced directives/living will available and if not they will discuss amongst themselves aspects of care like code status, etc. Ongoing discussion.

## 2022-12-06 NOTE — PROGRESS NOTE ADULT - SUBJECTIVE AND OBJECTIVE BOX
CC: Penile swelling  History of Present Illness:   77-year-old Male with history of dementia presented with penile swelling, redness and pain. Patient with dementia unable to contribute to HPI.  Per nursing home staff they stated he had some swelling in penile area since November 27 which had only been worsening over the past few days. No fever. Hill last changed on 27 November as well.  Patient does not recall if he is circumcised or not. No other complain.     s:  12/1: In bed, comfortable, confused at baseline.    12/2: No new events, lying in bed, comfortable.  Hill in place.  12/3: Limited ROS/Subjective due to baseline dementia. Pain present at penis. Controlled intermittently. Denies fever, chills, chest pain, SOB, nausea, vomiting, abdominal pain/discomfort.   12/4: Pain present and controlled. Limited ROS/CC in the setting of baseline dementia. s/p bedside manual reduction of foreskin (12/3). Maintaining local lidocaine. Urology on board. Hemodynamically stable.     12/5: no complaints, dementia at baseline  renal scan today, obstructed right kidney    12/6: no complaints  Cr increased to 1.35  BP soft 95/50  iv fluids started    REVIEW OF SYSTEMS: All other review of systems is negative unless indicated above.      PHYSICAL EXAM:    Daily     Daily     Vital Signs Last 24 Hrs  T(C): 36.9 (06 Dec 2022 07:58), Max: 37 (06 Dec 2022 01:23)  T(F): 98.4 (06 Dec 2022 07:58), Max: 98.6 (06 Dec 2022 01:23)  HR: 55 (06 Dec 2022 07:58) (55 - 72)  BP: 95/50 (06 Dec 2022 07:58) (95/50 - 122/61)  BP(mean): --  RR: 18 (06 Dec 2022 07:58) (17 - 18)  SpO2: 99% (06 Dec 2022 07:58) (92% - 99%)    Constitutional: Weak  appearing  HEENT: Atraumatic, MICKIE, Normal, No congestion  Respiratory: Breath Sounds normal, no rhonchi/wheeze  Cardiovascular: N S1S2;   Gastrointestinal: Abdomen soft, non tender, Bowel Sounds present  Extremities: No edema, peripheral pulses present  Neurological: AAO x 0, no gross focal motor deficits  Skin: Non cellulitic, no rash, ulcers  Lymph Nodes: No lymphadenopathy noted  Back: No CVA tenderness   Musculoskeletal: non tender  Breasts: Deferred  Genitourinary: deferred  Rectal: Deferred    All Labs/EKG/Radiology/Meds reviewed    Lab Results:  CBC  CBC Full  -  ( 06 Dec 2022 09:03 )  WBC Count : 6.05 K/uL  RBC Count : 3.28 M/uL  Hemoglobin : 10.3 g/dL  Hematocrit : 31.3 %  Platelet Count - Automated : 232 K/uL  Mean Cell Volume : 95.4 fl  Mean Cell Hemoglobin : 31.4 pg  Mean Cell Hemoglobin Concentration : 32.9 gm/dL  Auto Neutrophil # : x  Auto Lymphocyte # : x  Auto Monocyte # : x  Auto Eosinophil # : x  Auto Basophil # : x  Auto Neutrophil % : x  Auto Lymphocyte % : x  Auto Monocyte % : x  Auto Eosinophil % : x  Auto Basophil % : x    .		Differential:	[] Automated		[] Manual  Chemistry  12-06    138  |  104  |  37<H>  ----------------------------<  116<H>  4.0   |  29  |  1.35<H>    Ca    9.3      06 Dec 2022 09:03              12-05-22 @ 07:01  -  12-06-22 @ 07:00  --------------------------------------------------------  IN: 0 mL / OUT: 2175 mL / NET: -2175 mL      MICROBIOLOGY/CULTURES:  Culture Results:   >=3 organisms. Probable collection contamination. (12-01 @ 05:24)  Culture Results:   No Growth Final (12-01 @ 03:10)  Culture Results:   No Growth Final (12-01 @ 03:10)                rad< from: NM Renal/Lasix (NM Renal/Lasix .) (12.05.22 @ 13:25) >  IMPRESSION: Abnormal diuretic renal scan.    Obstructed right kidney with markedly diminished flow and function    Good flow to and function of the left kidney with no evidence of   obstruction.    Differential renal function: Right kidney 18.2 %; left kidney 81.8 %.      < end of copied text >        < from: CT Abdomen and Pelvis w/ IV Cont (12.01.22 @ 03:01) >  IMPRESSION:    Severe right hydronephrosis to the level of the right ureteral pelvic   junction. Delayed right nephrogram. No radiopaque stones seen. This is   similar to the previous exam.    No evidence for Rhiannon's gangrene on this examination.    < end of copied text >  < from: CT Head No Cont (10.14.22 @ 19:27) >  IMPRESSION:    Cervical spine CT: No acute fractures or dislocations.    Similar-appearing multilevel cervical spondylosis.    Head CT: No acute intracranial hemorrhage, mass effect, or shift of the   midline structures.    < end of copied text >    MEDICATIONS  (STANDING):  aspirin enteric coated 81 milliGRAM(s) Oral daily  cefepime   IVPB 2000 milliGRAM(s) IV Intermittent every 12 hours  clotrimazole/betamethasone Cream 1 Application(s) Topical two times a day  finasteride 5 milliGRAM(s) Oral daily  latanoprost 0.005% Ophthalmic Solution 1 Drop(s) Both EYES at bedtime  levothyroxine 50 MICROGram(s) Oral daily  lidocaine 2% Jelly 5 milliLiter(s) IntraUrethral once  sodium chloride 0.9%. 1000 milliLiter(s) (75 mL/Hr) IV Continuous <Continuous>  tamsulosin 0.4 milliGRAM(s) Oral at bedtime    MEDICATIONS  (PRN):  acetaminophen     Tablet .. 650 milliGRAM(s) Oral every 6 hours PRN Temp greater or equal to 38C (100.4F), Mild Pain (1 - 3)  aluminum hydroxide/magnesium hydroxide/simethicone Suspension 30 milliLiter(s) Oral every 4 hours PRN Dyspepsia  melatonin 3 milliGRAM(s) Oral at bedtime PRN Insomnia  morphine  - Injectable 2 milliGRAM(s) IV Push every 4 hours PRN Severe Pain (7 - 10)  ondansetron Injectable 4 milliGRAM(s) IV Push every 8 hours PRN Nausea and/or Vomiting

## 2022-12-07 LAB
ANION GAP SERPL CALC-SCNC: 6 MMOL/L — SIGNIFICANT CHANGE UP (ref 5–17)
BUN SERPL-MCNC: 41 MG/DL — HIGH (ref 7–23)
CALCIUM SERPL-MCNC: 8.9 MG/DL — SIGNIFICANT CHANGE UP (ref 8.5–10.1)
CHLORIDE SERPL-SCNC: 106 MMOL/L — SIGNIFICANT CHANGE UP (ref 96–108)
CO2 SERPL-SCNC: 25 MMOL/L — SIGNIFICANT CHANGE UP (ref 22–31)
CREAT SERPL-MCNC: 1.23 MG/DL — SIGNIFICANT CHANGE UP (ref 0.5–1.3)
EGFR: 60 ML/MIN/1.73M2 — SIGNIFICANT CHANGE UP
GLUCOSE SERPL-MCNC: 114 MG/DL — HIGH (ref 70–99)
POTASSIUM SERPL-MCNC: 3.7 MMOL/L — SIGNIFICANT CHANGE UP (ref 3.5–5.3)
POTASSIUM SERPL-SCNC: 3.7 MMOL/L — SIGNIFICANT CHANGE UP (ref 3.5–5.3)
SARS-COV-2 RNA SPEC QL NAA+PROBE: SIGNIFICANT CHANGE UP
SODIUM SERPL-SCNC: 137 MMOL/L — SIGNIFICANT CHANGE UP (ref 135–145)

## 2022-12-07 PROCEDURE — 99232 SBSQ HOSP IP/OBS MODERATE 35: CPT

## 2022-12-07 RX ADMIN — CLOTRIMAZOLE AND BETAMETHASONE DIPROPIONATE 1 APPLICATION(S): 10; .5 CREAM TOPICAL at 17:27

## 2022-12-07 RX ADMIN — Medication 81 MILLIGRAM(S): at 10:04

## 2022-12-07 RX ADMIN — TAMSULOSIN HYDROCHLORIDE 0.4 MILLIGRAM(S): 0.4 CAPSULE ORAL at 21:35

## 2022-12-07 RX ADMIN — LATANOPROST 1 DROP(S): 0.05 SOLUTION/ DROPS OPHTHALMIC; TOPICAL at 21:35

## 2022-12-07 RX ADMIN — Medication 50 MICROGRAM(S): at 05:09

## 2022-12-07 RX ADMIN — CEFEPIME 100 MILLIGRAM(S): 1 INJECTION, POWDER, FOR SOLUTION INTRAMUSCULAR; INTRAVENOUS at 10:04

## 2022-12-07 RX ADMIN — FINASTERIDE 5 MILLIGRAM(S): 5 TABLET, FILM COATED ORAL at 10:05

## 2022-12-07 RX ADMIN — CLOTRIMAZOLE AND BETAMETHASONE DIPROPIONATE 1 APPLICATION(S): 10; .5 CREAM TOPICAL at 05:09

## 2022-12-07 RX ADMIN — SODIUM CHLORIDE 75 MILLILITER(S): 9 INJECTION INTRAMUSCULAR; INTRAVENOUS; SUBCUTANEOUS at 17:29

## 2022-12-07 RX ADMIN — CEFEPIME 100 MILLIGRAM(S): 1 INJECTION, POWDER, FOR SOLUTION INTRAMUSCULAR; INTRAVENOUS at 21:34

## 2022-12-07 NOTE — PROGRESS NOTE ADULT - ASSESSMENT
77 year old man with penile infection / UTI:      Penile infection / cellulitis / balanitis / UTI / right hydronephrosis: abnormal Renal  Lasix scan, Right kidney obstructed; f/u with Dr. Chowdary as out pt for Pyeloplasty   -CT noted  -chronic woodson replaced in ED  -c/w cefepime dose adjusted per ID  -BCx no growth x 2  -urology following  -supportive care  -BPH-continue home medi    Anemia: Stable  -no active GI bleeding, Guaiac -neg  -may be AOCD  -Ferritin/Iron/B12/Folate ordered    Increased Cr + Soft BP 95/50: likely sec to lasix given for renal scan  start iv fluids  BMP in am  monitor BP  BP improving with iv fluids    DM. Controlled  -A1c 6.2%,  -BGM less than 150. Discontinue BGM/ISS    Hypothyroidism;  -Continue levothyroxine    DVT ppx; SCDs to continue as there was some bleeding with manual reduction of the foreskin on 12/4.     dispo:  -d/c to [facility] once medically ready likely 12/7; BMP, cbc in am, monitor BP      Code status: Full code.   Discussed status and plan of care with niece Cathryn Sampson (RN) @ [phone] on 12/4. Agreeable with plan of care  HCP nephew Siddhartha Sampson [phone] updated 12/4; 12/6: called and left a message.   Also initiated GOC discussion. Limited information at this time. Niece (Cathryn) and Nephew/HCP (Siddhartha) to see if any advanced directives/living will available and if not they will discuss amongst themselves aspects of care like code status, etc. Ongoing discussion.     DISPO: if BUN/Cr better , BP stable and no fever, then D/C on 12/8

## 2022-12-07 NOTE — PATIENT PROFILE ADULT - CENTRAL VENOUS CATHETER/PICC LINE
I have reviewed the notes, assessments, and/or procedures performed by Dr. Donna Mcgee, I have discussed the case with Dr. Mcgee and concur with her/his documentation of Ashley Ayala.    no

## 2022-12-07 NOTE — PROGRESS NOTE ADULT - SUBJECTIVE AND OBJECTIVE BOX
CC: Penile swelling  History of Present Illness:   77-year-old Male with history of dementia presented with penile swelling, redness and pain. Patient with dementia unable to contribute to HPI.  Per nursing home staff they stated he had some swelling in penile area since November 27 which had only been worsening over the past few days. No fever. Hill last changed on 27 November as well.  Patient does not recall if he is circumcised or not. No other complain.     s:  12/1: In bed, comfortable, confused at baseline.    12/2: No new events, lying in bed, comfortable.  Hill in place.  12/3: Limited ROS/Subjective due to baseline dementia. Pain present at penis. Controlled intermittently. Denies fever, chills, chest pain, SOB, nausea, vomiting, abdominal pain/discomfort.   12/4: Pain present and controlled. Limited ROS/CC in the setting of baseline dementia. s/p bedside manual reduction of foreskin (12/3). Maintaining local lidocaine. Urology on board. Hemodynamically stable.     12/5: no complaints, dementia at baseline  renal scan today, obstructed right kidney    12/6: no complaints  Cr increased to 1.35  BP soft 95/50  iv fluids started    12/7: no complaints  low grade temp of 99.1 last night  Cr better with iv fluids  BUN still elevated; cont iv fluids  monitor for fever x 24 hrs  BP better with iv fluids  cont cefepime  BMP in am    REVIEW OF SYSTEMS: All other review of systems is negative unless indicated above.      PHYSICAL EXAM:    Vital Signs Last 24 Hrs  T(C): 36.7 (07 Dec 2022 07:37), Max: 37.3 (06 Dec 2022 23:46)  T(F): 98 (07 Dec 2022 07:37), Max: 99.1 (06 Dec 2022 23:46)  HR: 60 (07 Dec 2022 07:37) (58 - 69)  BP: 113/57 (07 Dec 2022 07:37) (102/56 - 113/57)  BP(mean): --  RR: 19 (07 Dec 2022 07:37) (18 - 19)  SpO2: 98% (07 Dec 2022 07:37) (97% - 99%)    Parameters below as of 07 Dec 2022 07:37  Patient On (Oxygen Delivery Method): room air        Constitutional: Weak  appearing  HEENT: Atraumatic, MICKIE, Normal, No congestion  Respiratory: Breath Sounds normal, no rhonchi/wheeze  Cardiovascular: N S1S2;   Gastrointestinal: Abdomen soft, non tender, Bowel Sounds present  Extremities: No edema, peripheral pulses present  Neurological: AAO x 1, no gross focal motor deficits  Skin: Non cellulitic, no rash, ulcers  Lymph Nodes: No lymphadenopathy noted  Back: No CVA tenderness   Musculoskeletal: non tender  Breasts: Deferred  Genitourinary: deferred  Rectal: Deferred    All Labs/EKG/Radiology/Meds reviewed    Lab Results:  CBC  CBC Full  -  ( 06 Dec 2022 09:03 )  WBC Count : 6.05 K/uL  RBC Count : 3.28 M/uL  Hemoglobin : 10.3 g/dL  Hematocrit : 31.3 %  Platelet Count - Automated : 232 K/uL  Mean Cell Volume : 95.4 fl  Mean Cell Hemoglobin : 31.4 pg  Mean Cell Hemoglobin Concentration : 32.9 gm/dL  Auto Neutrophil # : x  Auto Lymphocyte # : x  Auto Monocyte # : x  Auto Eosinophil # : x  Auto Basophil # : x  Auto Neutrophil % : x  Auto Lymphocyte % : x  Auto Monocyte % : x  Auto Eosinophil % : x  Auto Basophil % : x    .		Differential:	[] Automated		[] Manual  Chemistry  12-07    137  |  106  |  41<H>  ----------------------------<  114<H>  3.7   |  25  |  1.23    Ca    8.9      07 Dec 2022 07:22              12-06-22 @ 07:01  -  12-07-22 @ 07:00  --------------------------------------------------------  IN: 0 mL / OUT: 1050 mL / NET: -1050 mL      MICROBIOLOGY/CULTURES:  Culture Results:   >=3 organisms. Probable collection contamination. (12-01 @ 05:24)  Culture Results:   No Growth Final (12-01 @ 03:10)  Culture Results:   No Growth Final (12-01 @ 03:10)                      rad< from: NM Renal/Lasix (NM Renal/Lasix .) (12.05.22 @ 13:25) >  IMPRESSION: Abnormal diuretic renal scan.    Obstructed right kidney with markedly diminished flow and function    Good flow to and function of the left kidney with no evidence of   obstruction.    Differential renal function: Right kidney 18.2 %; left kidney 81.8 %.      < end of copied text >        < from: CT Abdomen and Pelvis w/ IV Cont (12.01.22 @ 03:01) >  IMPRESSION:    Severe right hydronephrosis to the level of the right ureteral pelvic   junction. Delayed right nephrogram. No radiopaque stones seen. This is   similar to the previous exam.    No evidence for Rhiannon's gangrene on this examination.    < end of copied text >  < from: CT Head No Cont (10.14.22 @ 19:27) >  IMPRESSION:    Cervical spine CT: No acute fractures or dislocations.    Similar-appearing multilevel cervical spondylosis.    Head CT: No acute intracranial hemorrhage, mass effect, or shift of the   midline structures.    < end of copied text >    MEDICATIONS  (STANDING):  aspirin enteric coated 81 milliGRAM(s) Oral daily  cefepime   IVPB 2000 milliGRAM(s) IV Intermittent every 12 hours  clotrimazole/betamethasone Cream 1 Application(s) Topical two times a day  finasteride 5 milliGRAM(s) Oral daily  latanoprost 0.005% Ophthalmic Solution 1 Drop(s) Both EYES at bedtime  levothyroxine 50 MICROGram(s) Oral daily  lidocaine 2% Jelly 5 milliLiter(s) IntraUrethral once  sodium chloride 0.9%. 1000 milliLiter(s) (75 mL/Hr) IV Continuous <Continuous>  tamsulosin 0.4 milliGRAM(s) Oral at bedtime    MEDICATIONS  (PRN):  acetaminophen     Tablet .. 650 milliGRAM(s) Oral every 6 hours PRN Temp greater or equal to 38C (100.4F), Mild Pain (1 - 3)  aluminum hydroxide/magnesium hydroxide/simethicone Suspension 30 milliLiter(s) Oral every 4 hours PRN Dyspepsia  melatonin 3 milliGRAM(s) Oral at bedtime PRN Insomnia  morphine  - Injectable 2 milliGRAM(s) IV Push every 4 hours PRN Severe Pain (7 - 10)  ondansetron Injectable 4 milliGRAM(s) IV Push every 8 hours PRN Nausea and/or Vomiting

## 2022-12-07 NOTE — PROGRESS NOTE ADULT - REASON FOR ADMISSION
Penile swelling and infection

## 2022-12-07 NOTE — PROGRESS NOTE ADULT - PROVIDER SPECIALTY LIST ADULT
Hospitalist
Urology
Hospitalist
Infectious Disease
Urology
Hospitalist
Hospitalist
Infectious Disease
Urology

## 2022-12-08 ENCOUNTER — TRANSCRIPTION ENCOUNTER (OUTPATIENT)
Age: 77
End: 2022-12-08

## 2022-12-08 VITALS
TEMPERATURE: 97 F | RESPIRATION RATE: 18 BRPM | DIASTOLIC BLOOD PRESSURE: 59 MMHG | HEART RATE: 65 BPM | OXYGEN SATURATION: 100 % | SYSTOLIC BLOOD PRESSURE: 106 MMHG

## 2022-12-08 LAB
ANION GAP SERPL CALC-SCNC: 6 MMOL/L — SIGNIFICANT CHANGE UP (ref 5–17)
BUN SERPL-MCNC: 37 MG/DL — HIGH (ref 7–23)
CALCIUM SERPL-MCNC: 9.1 MG/DL — SIGNIFICANT CHANGE UP (ref 8.5–10.1)
CHLORIDE SERPL-SCNC: 108 MMOL/L — SIGNIFICANT CHANGE UP (ref 96–108)
CO2 SERPL-SCNC: 25 MMOL/L — SIGNIFICANT CHANGE UP (ref 22–31)
CREAT SERPL-MCNC: 1.23 MG/DL — SIGNIFICANT CHANGE UP (ref 0.5–1.3)
EGFR: 60 ML/MIN/1.73M2 — SIGNIFICANT CHANGE UP
GLUCOSE SERPL-MCNC: 148 MG/DL — HIGH (ref 70–99)
POTASSIUM SERPL-MCNC: 3.9 MMOL/L — SIGNIFICANT CHANGE UP (ref 3.5–5.3)
POTASSIUM SERPL-SCNC: 3.9 MMOL/L — SIGNIFICANT CHANGE UP (ref 3.5–5.3)
SODIUM SERPL-SCNC: 139 MMOL/L — SIGNIFICANT CHANGE UP (ref 135–145)

## 2022-12-08 PROCEDURE — 99239 HOSP IP/OBS DSCHRG MGMT >30: CPT

## 2022-12-08 RX ORDER — CLOTRIMAZOLE AND BETAMETHASONE DIPROPIONATE 10; .5 MG/G; MG/G
1 CREAM TOPICAL
Qty: 0 | Refills: 0 | DISCHARGE
Start: 2022-12-08

## 2022-12-08 RX ORDER — TAMSULOSIN HYDROCHLORIDE 0.4 MG/1
1 CAPSULE ORAL
Qty: 0 | Refills: 0 | DISCHARGE

## 2022-12-08 RX ORDER — FINASTERIDE 5 MG/1
1 TABLET, FILM COATED ORAL
Qty: 0 | Refills: 0 | DISCHARGE
Start: 2022-12-08

## 2022-12-08 RX ORDER — TAMSULOSIN HYDROCHLORIDE 0.4 MG/1
1 CAPSULE ORAL
Qty: 0 | Refills: 0 | DISCHARGE
Start: 2022-12-08

## 2022-12-08 RX ORDER — CEFUROXIME AXETIL 250 MG
1 TABLET ORAL
Qty: 0 | Refills: 0 | DISCHARGE

## 2022-12-08 RX ADMIN — Medication 50 MICROGRAM(S): at 06:16

## 2022-12-08 RX ADMIN — Medication 81 MILLIGRAM(S): at 11:34

## 2022-12-08 RX ADMIN — CLOTRIMAZOLE AND BETAMETHASONE DIPROPIONATE 1 APPLICATION(S): 10; .5 CREAM TOPICAL at 06:16

## 2022-12-08 RX ADMIN — CEFEPIME 100 MILLIGRAM(S): 1 INJECTION, POWDER, FOR SOLUTION INTRAMUSCULAR; INTRAVENOUS at 11:34

## 2022-12-08 RX ADMIN — FINASTERIDE 5 MILLIGRAM(S): 5 TABLET, FILM COATED ORAL at 11:35

## 2022-12-08 NOTE — DISCHARGE NOTE PROVIDER - NSDCCPCAREPLAN_GEN_ALL_CORE_FT
PRINCIPAL DISCHARGE DIAGNOSIS  Diagnosis: Penile swelling  Assessment and Plan of Treatment: continue ceftin 500 mg orally q 12 hrs x 10 days till 12/18/22  f/u with urology  in 1 week      SECONDARY DISCHARGE DIAGNOSES  Diagnosis: Acute UTI  Assessment and Plan of Treatment: treated with ABX

## 2022-12-08 NOTE — DISCHARGE NOTE PROVIDER - NSDCMRMEDTOKEN_GEN_ALL_CORE_FT
Aspirin Enteric Coated 81 mg oral delayed release tablet: 1 tab(s) orally once a day x 30 days for COVID VTE prophylaxis   Ceftin 500 mg oral tablet: 1 tab(s) orally every 12 hours  clotrimazole-betamethasone dipropionate 1%-0.05% topical cream: 1 application topically 2 times a day  finasteride 5 mg oral tablet: 1 tab(s) orally once a day  latanoprost 0.005% ophthalmic solution: 1 drop(s) to each affected eye once a day (in the evening)  levothyroxine 50 mcg (0.05 mg) oral tablet: 1 tab(s) orally once a day  lovastatin 20 mg oral tablet: 1 tab(s) orally once a day  metFORMIN 500 mg oral tablet: 1 tab(s) orally 2 times a day  Rhopressa 0.02% ophthalmic solution: 1 drop(s) to each affected eye once a day (in the evening)  tamsulosin 0.4 mg oral capsule: 1 cap(s) orally once a day (at bedtime)

## 2022-12-08 NOTE — DISCHARGE NOTE PROVIDER - CARE PROVIDER_API CALL
Josiah Chowdary)  Urology  [address], [address] [place] [address]  Phone: [phone]  Fax: [phone]  Follow Up Time:

## 2022-12-08 NOTE — DISCHARGE NOTE PROVIDER - CARE PROVIDERS DIRECT ADDRESSES
[email or web address]

## 2022-12-08 NOTE — DISCHARGE NOTE PROVIDER - HOSPITAL COURSE
PHYSICAL EXAM:    Daily     Daily     Vital Signs Last 24 Hrs  T(C): 36.7 (08 Dec 2022 07:43), Max: 37 (07 Dec 2022 23:25)  T(F): 98.1 (08 Dec 2022 07:43), Max: 98.6 (07 Dec 2022 23:25)  HR: 95 (08 Dec 2022 07:43) (64 - 95)  BP: 119/74 (08 Dec 2022 07:43) (106/69 - 119/74)  BP(mean): --  RR: 18 (08 Dec 2022 07:43) (18 - 19)  SpO2: 95% (08 Dec 2022 07:43) (95% - 100%)    Constitutional: Weak  appearing  HEENT: Atraumatic, MICKIE, Normal, No congestion  Respiratory: Breath Sounds normal, no rhonchi/wheeze  Cardiovascular: N S1S2;   Gastrointestinal: Abdomen soft, non tender, Bowel Sounds present  Extremities: No edema, peripheral pulses present  Neurological: AAO x 0, no gross focal motor deficits  Skin: Non cellulitic, no rash, ulcers  Lymph Nodes: No lymphadenopathy noted  Back: No CVA tenderness   Musculoskeletal: non tender  Breasts: Deferred  Genitourinary: penile swelling and erythema improving  Rectal: Deferred      77 year old man with penile infection / UTI:      Penile infection / cellulitis / balanitis / UTI sec to chronic woodson/ right hydronephrosis: abnormal Renal  Lasix scan, Right kidney obstructed; f/u with Dr. Chowdary as out pt for Pyeloplasty   -CT noted  -chronic woodson replaced in ED  given cefepime 2 g  q 12hrs; d/c on ceftin 500 mg orally 2 times per day x 10 days till 12/18/22  -BCx no growth x 2  -urology following  -supportive care  -BPH-continue home meds    Anemia: Stable  -no active GI bleeding, Guaiac -neg  -may be AOCD    Increased Cr + Soft BP 95/50: likely sec to lasix given for renal scan  started on iv fluids  BMP in am  monitor BP  BP normalized    DM. Controlled  -A1c 6.2%,  -BGM less than 150. Discontinue BGM/ISS    Hypothyroidism;  -Continue levothyroxine    dispo:  -d/c to [facility] term [facility]       d/c back to [facility]      time spent 45 min

## 2022-12-08 NOTE — DISCHARGE NOTE NURSING/CASE MANAGEMENT/SOCIAL WORK - PATIENT PORTAL LINK FT
You can access the FollowMyHealth Patient Portal offered by [facility] by registering at the following website: [email or web address]. By joining [facility]’s FollowMyHealth portal, you will also be able to view your health information using other applications (apps) compatible with our system.

## 2022-12-08 NOTE — DISCHARGE NOTE PROVIDER - NSDCCPGOAL_GEN_ALL_CORE_FT
Returned from [facility] awake and alert denying any pain  ivs running  Dressing dry and intact  Abdomen soft  Medicated with percocet for same 
To get better and follow your care plan as instructed.

## 2022-12-08 NOTE — CDI QUERY NOTE - NSCDIOTHERTXTBX_GEN_ALL_CORE_HH
Please document the relationship between the following conditions: UTI and chronic Woodson catheter     -UTI associated with chronic Woodson catheter   -UTI not associated with chronic Woodson catheter   -Other please specify  -Unable to determine    SUPPORTING DOCUMENTATION AND/OR CLINICAL EVIDENCE:    PN urology 12/5-Continue Lotrisone to foreskin. Continue indwelling Woodson catheter. Problem: Chronic indwelling Woodson catheter. BPH with obstruction/lower urinary tract symptoms.     PN medicine 12/70-69-kycz-old Male with history of dementia presented with penile swelling, redness and pain. Patient with dementia unable to contribute to HPI.  Per nursing home staff they stated he had some swelling in penile area since November 27 which had only been worsening over the past few days. No fever. Woodson last changed on 27 November as well.  Patient does not recall if he is circumcised or not. No other complain. Penile infection / cellulitis / balanitis / UTI / right hydronephrosis: abnormal Renal  Lasix scan, Right kidney obstructed; f/u with Dr. Chowdary as out pt for Pyeloplasty   -CT noted-chronic woodson replaced in ED

## 2022-12-19 DIAGNOSIS — E11.9 TYPE 2 DIABETES MELLITUS WITHOUT COMPLICATIONS: ICD-10-CM

## 2022-12-19 DIAGNOSIS — N13.6 PYONEPHROSIS: ICD-10-CM

## 2022-12-19 DIAGNOSIS — Z79.84 LONG TERM (CURRENT) USE OF ORAL HYPOGLYCEMIC DRUGS: ICD-10-CM

## 2022-12-19 DIAGNOSIS — R03.1 NONSPECIFIC LOW BLOOD-PRESSURE READING: ICD-10-CM

## 2022-12-19 DIAGNOSIS — T50.1X5A ADVERSE EFFECT OF LOOP [HIGH-CEILING] DIURETICS, INITIAL ENCOUNTER: ICD-10-CM

## 2022-12-19 DIAGNOSIS — Z20.822 CONTACT WITH AND (SUSPECTED) EXPOSURE TO COVID-19: ICD-10-CM

## 2022-12-19 DIAGNOSIS — R79.89 OTHER SPECIFIED ABNORMAL FINDINGS OF BLOOD CHEMISTRY: ICD-10-CM

## 2022-12-19 DIAGNOSIS — F03.90 UNSPECIFIED DEMENTIA, UNSPECIFIED SEVERITY, WITHOUT BEHAVIORAL DISTURBANCE, PSYCHOTIC DISTURBANCE, MOOD DISTURBANCE, AND ANXIETY: ICD-10-CM

## 2022-12-19 DIAGNOSIS — N13.8 OTHER OBSTRUCTIVE AND REFLUX UROPATHY: ICD-10-CM

## 2022-12-19 DIAGNOSIS — T83.511A INFECTION AND INFLAMMATORY REACTION DUE TO INDWELLING URETHRAL CATHETER, INITIAL ENCOUNTER: ICD-10-CM

## 2022-12-19 DIAGNOSIS — I10 ESSENTIAL (PRIMARY) HYPERTENSION: ICD-10-CM

## 2022-12-19 DIAGNOSIS — N48.22 CELLULITIS OF CORPUS CAVERNOSUM AND PENIS: ICD-10-CM

## 2022-12-19 DIAGNOSIS — H40.9 UNSPECIFIED GLAUCOMA: ICD-10-CM

## 2022-12-19 DIAGNOSIS — D63.8 ANEMIA IN OTHER CHRONIC DISEASES CLASSIFIED ELSEWHERE: ICD-10-CM
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
## 2022-12-19 DIAGNOSIS — E03.9 HYPOTHYROIDISM, UNSPECIFIED: ICD-10-CM

## 2022-12-19 DIAGNOSIS — N47.2 PARAPHIMOSIS: ICD-10-CM

## 2022-12-19 DIAGNOSIS — N48.1 BALANITIS: ICD-10-CM

## 2022-12-19 DIAGNOSIS — N40.1 BENIGN PROSTATIC HYPERPLASIA WITH LOWER URINARY TRACT SYMPTOMS: ICD-10-CM

## 2022-12-19 DIAGNOSIS — E78.5 HYPERLIPIDEMIA, UNSPECIFIED: ICD-10-CM

## 2022-12-19 DIAGNOSIS — R33.8 OTHER RETENTION OF URINE: ICD-10-CM

## 2022-12-19 DIAGNOSIS — Z82.49 FAMILY HISTORY OF ISCHEMIC HEART DISEASE AND OTHER DISEASES OF THE CIRCULATORY SYSTEM: ICD-10-CM

## 2022-12-19 DIAGNOSIS — Z79.890 HORMONE REPLACEMENT THERAPY: ICD-10-CM

## 2022-12-19 DIAGNOSIS — Z79.82 LONG TERM (CURRENT) USE OF ASPIRIN: ICD-10-CM

## 2023-07-25 NOTE — DISCHARGE NOTE PROVIDER - COLLABORATE WITH
Initiate Treatment: Derma-Smoothe/FS Scalp Oil 0.01 % \\nQuantity: 118.28 ml\\nSig: Apply tp to scalp bid prn Detail Level: Zone ACP

## 2023-10-02 ENCOUNTER — APPOINTMENT (OUTPATIENT)
Dept: CARDIOLOGY | Facility: CLINIC | Age: 78
End: 2023-10-02
Payer: MEDICARE

## 2023-10-02 VITALS — SYSTOLIC BLOOD PRESSURE: 112 MMHG | OXYGEN SATURATION: 100 % | DIASTOLIC BLOOD PRESSURE: 60 MMHG | HEART RATE: 70 BPM

## 2023-10-02 DIAGNOSIS — Z00.00 ENCOUNTER FOR GENERAL ADULT MEDICAL EXAMINATION W/OUT ABNORMAL FINDINGS: ICD-10-CM

## 2023-10-02 DIAGNOSIS — N13.30 UNSPECIFIED HYDRONEPHROSIS: ICD-10-CM

## 2023-10-02 PROCEDURE — 93000 ELECTROCARDIOGRAM COMPLETE: CPT | Mod: NC

## 2023-10-02 PROCEDURE — 99203 OFFICE O/P NEW LOW 30 MIN: CPT

## 2023-10-04 ENCOUNTER — APPOINTMENT (OUTPATIENT)
Dept: CARDIOLOGY | Facility: CLINIC | Age: 78
End: 2023-10-04
Payer: MEDICARE

## 2023-10-04 DIAGNOSIS — R53.1 WEAKNESS: ICD-10-CM

## 2023-10-04 DIAGNOSIS — M46.92 UNSPECIFIED INFLAMMATORY SPONDYLOPATHY, CERVICAL REGION: ICD-10-CM

## 2023-10-04 DIAGNOSIS — E63.9 NUTRITIONAL DEFICIENCY, UNSPECIFIED: ICD-10-CM

## 2023-10-04 DIAGNOSIS — I11.0 HYPERTENSIVE HEART DISEASE WITH HEART FAILURE: ICD-10-CM

## 2023-10-04 DIAGNOSIS — N40.1 BENIGN PROSTATIC HYPERPLASIA WITH LOWER URINARY TRACT SYMPMS: ICD-10-CM

## 2023-10-04 DIAGNOSIS — E11.9 TYPE 2 DIABETES MELLITUS W/OUT COMPLICATIONS: ICD-10-CM

## 2023-10-04 DIAGNOSIS — Z97.8 PRESENCE OF OTHER SPECIFIED DEVICES: ICD-10-CM

## 2023-10-04 DIAGNOSIS — E03.9 HYPOTHYROIDISM, UNSPECIFIED: ICD-10-CM

## 2023-10-04 DIAGNOSIS — N13.8 BENIGN PROSTATIC HYPERPLASIA WITH LOWER URINARY TRACT SYMPMS: ICD-10-CM

## 2023-10-04 DIAGNOSIS — R41.82 ALTERED MENTAL STATUS, UNSPECIFIED: ICD-10-CM

## 2023-10-04 DIAGNOSIS — Z87.440 PERSONAL HISTORY OF URINARY (TRACT) INFECTIONS: ICD-10-CM

## 2023-10-04 DIAGNOSIS — Z87.438 PERSONAL HISTORY OF OTHER DISEASES OF MALE GENITAL ORGANS: ICD-10-CM

## 2023-10-04 DIAGNOSIS — I10 ESSENTIAL (PRIMARY) HYPERTENSION: ICD-10-CM

## 2023-10-04 DIAGNOSIS — E78.5 HYPERLIPIDEMIA, UNSPECIFIED: ICD-10-CM

## 2023-10-04 DIAGNOSIS — Z86.718 PERSONAL HISTORY OF OTHER VENOUS THROMBOSIS AND EMBOLISM: ICD-10-CM

## 2023-10-04 PROCEDURE — 93306 TTE W/DOPPLER COMPLETE: CPT

## 2023-10-28 NOTE — H&P ADULT - NSICDXPASTMEDICALHX_GEN_ALL_CORE_FT
97
PAST MEDICAL HISTORY:  DM (diabetes mellitus)     Glaucoma     HTN (hypertension)     Hypothyroid
